# Patient Record
Sex: MALE | Race: WHITE | NOT HISPANIC OR LATINO | Employment: FULL TIME | URBAN - METROPOLITAN AREA
[De-identification: names, ages, dates, MRNs, and addresses within clinical notes are randomized per-mention and may not be internally consistent; named-entity substitution may affect disease eponyms.]

---

## 2017-08-09 ENCOUNTER — HOSPITAL ENCOUNTER (OUTPATIENT)
Dept: RADIOLOGY | Facility: MEDICAL CENTER | Age: 47
Discharge: HOME | End: 2017-08-09
Payer: COMMERCIAL

## 2017-08-09 ENCOUNTER — TRANSCRIBE ORDERS (OUTPATIENT)
Dept: ADMINISTRATIVE | Facility: HOSPITAL | Age: 47
End: 2017-08-09

## 2017-08-09 DIAGNOSIS — N50.819 ORCHIALGIA: Primary | ICD-10-CM

## 2017-08-09 DIAGNOSIS — N50.819 ORCHIALGIA: ICD-10-CM

## 2017-08-09 PROCEDURE — 76870 US EXAM SCROTUM: CPT

## 2017-08-15 ENCOUNTER — APPOINTMENT (EMERGENCY)
Dept: RADIOLOGY | Facility: HOSPITAL | Age: 47
End: 2017-08-15
Payer: COMMERCIAL

## 2017-08-15 ENCOUNTER — HOSPITAL ENCOUNTER (EMERGENCY)
Facility: HOSPITAL | Age: 47
Discharge: HOME | End: 2017-08-15
Attending: EMERGENCY MEDICINE
Payer: COMMERCIAL

## 2017-08-15 VITALS
OXYGEN SATURATION: 100 % | RESPIRATION RATE: 16 BRPM | WEIGHT: 230 LBS | SYSTOLIC BLOOD PRESSURE: 125 MMHG | TEMPERATURE: 98.1 F | HEART RATE: 58 BPM | DIASTOLIC BLOOD PRESSURE: 84 MMHG

## 2017-08-15 DIAGNOSIS — R07.9 CHEST PAIN: Primary | ICD-10-CM

## 2017-08-15 LAB
ALBUMIN SERPL BCP-MCNC: 3.8 G/DL (ref 3.5–5)
ALP SERPL-CCNC: 93 U/L (ref 46–116)
ALT SERPL W P-5'-P-CCNC: 36 U/L (ref 12–78)
ANION GAP SERPL CALCULATED.3IONS-SCNC: 8 MMOL/L (ref 4–13)
AST SERPL W P-5'-P-CCNC: 21 U/L (ref 5–45)
ATRIAL RATE: 63 BPM
BASOPHILS # BLD AUTO: 0.03 THOUSANDS/ΜL (ref 0–0.1)
BASOPHILS NFR BLD AUTO: 0 % (ref 0–1)
BILIRUB SERPL-MCNC: 0.6 MG/DL (ref 0.2–1)
BUN SERPL-MCNC: 10 MG/DL (ref 5–25)
CALCIUM SERPL-MCNC: 9.1 MG/DL (ref 8.3–10.1)
CHLORIDE SERPL-SCNC: 103 MMOL/L (ref 100–108)
CO2 SERPL-SCNC: 28 MMOL/L (ref 21–32)
CREAT SERPL-MCNC: 0.99 MG/DL (ref 0.6–1.3)
EOSINOPHIL # BLD AUTO: 0.12 THOUSAND/ΜL (ref 0–0.61)
EOSINOPHIL NFR BLD AUTO: 2 % (ref 0–6)
ERYTHROCYTE [DISTWIDTH] IN BLOOD BY AUTOMATED COUNT: 14.2 % (ref 11.6–15.1)
GFR SERPL CREATININE-BSD FRML MDRD: 92 ML/MIN/1.73SQ M
GLUCOSE SERPL-MCNC: 136 MG/DL (ref 65–140)
HCT VFR BLD AUTO: 46.6 % (ref 36.5–49.3)
HGB BLD-MCNC: 15.4 G/DL (ref 12–17)
LYMPHOCYTES # BLD AUTO: 1.77 THOUSANDS/ΜL (ref 0.6–4.47)
LYMPHOCYTES NFR BLD AUTO: 26 % (ref 14–44)
MCH RBC QN AUTO: 31.9 PG (ref 26.8–34.3)
MCHC RBC AUTO-ENTMCNC: 33 G/DL (ref 31.4–37.4)
MCV RBC AUTO: 97 FL (ref 82–98)
MONOCYTES # BLD AUTO: 0.56 THOUSAND/ΜL (ref 0.17–1.22)
MONOCYTES NFR BLD AUTO: 8 % (ref 4–12)
NEUTROPHILS # BLD AUTO: 4.42 THOUSANDS/ΜL (ref 1.85–7.62)
NEUTS SEG NFR BLD AUTO: 64 % (ref 43–75)
P AXIS: 65 DEGREES
PLATELET # BLD AUTO: 205 THOUSANDS/UL (ref 149–390)
PMV BLD AUTO: 10.7 FL (ref 8.9–12.7)
POTASSIUM SERPL-SCNC: 4.2 MMOL/L (ref 3.5–5.3)
PR INTERVAL: 156 MS
PROT SERPL-MCNC: 7.7 G/DL (ref 6.4–8.2)
QRS AXIS: 62 DEGREES
QRSD INTERVAL: 92 MS
QT INTERVAL: 400 MS
QTC INTERVAL: 409 MS
RBC # BLD AUTO: 4.83 MILLION/UL (ref 3.88–5.62)
SODIUM SERPL-SCNC: 139 MMOL/L (ref 136–145)
T WAVE AXIS: 54 DEGREES
TROPONIN I SERPL-MCNC: <0.02 NG/ML
VENTRICULAR RATE: 63 BPM
WBC # BLD AUTO: 6.9 THOUSAND/UL (ref 4.31–10.16)

## 2017-08-15 PROCEDURE — 93005 ELECTROCARDIOGRAM TRACING: CPT

## 2017-08-15 PROCEDURE — 84484 ASSAY OF TROPONIN QUANT: CPT | Performed by: EMERGENCY MEDICINE

## 2017-08-15 PROCEDURE — 99285 EMERGENCY DEPT VISIT HI MDM: CPT

## 2017-08-15 PROCEDURE — 93005 ELECTROCARDIOGRAM TRACING: CPT | Performed by: EMERGENCY MEDICINE

## 2017-08-15 PROCEDURE — 36415 COLL VENOUS BLD VENIPUNCTURE: CPT | Performed by: EMERGENCY MEDICINE

## 2017-08-15 PROCEDURE — 85025 COMPLETE CBC W/AUTO DIFF WBC: CPT | Performed by: EMERGENCY MEDICINE

## 2017-08-15 PROCEDURE — 71020 HB CHEST X-RAY 2VW FRONTAL&LATL: CPT

## 2017-08-15 PROCEDURE — 80053 COMPREHEN METABOLIC PANEL: CPT | Performed by: EMERGENCY MEDICINE

## 2017-12-15 ENCOUNTER — TRANSCRIBE ORDERS (OUTPATIENT)
Dept: ADMINISTRATIVE | Facility: HOSPITAL | Age: 47
End: 2017-12-15

## 2017-12-15 ENCOUNTER — APPOINTMENT (OUTPATIENT)
Dept: LAB | Facility: MEDICAL CENTER | Age: 47
End: 2017-12-15
Payer: COMMERCIAL

## 2017-12-15 DIAGNOSIS — L40.0 PSORIASIS VULGARIS: Primary | ICD-10-CM

## 2017-12-15 LAB
ALBUMIN SERPL BCP-MCNC: 3.8 G/DL (ref 3.5–5)
ALP SERPL-CCNC: 85 U/L (ref 46–116)
ALT SERPL W P-5'-P-CCNC: 30 U/L (ref 12–78)
ANION GAP SERPL CALCULATED.3IONS-SCNC: 4 MMOL/L (ref 4–13)
AST SERPL W P-5'-P-CCNC: 25 U/L (ref 5–45)
BASOPHILS # BLD AUTO: 0.03 THOUSANDS/ΜL (ref 0–0.1)
BASOPHILS NFR BLD AUTO: 0 % (ref 0–1)
BILIRUB DIRECT SERPL-MCNC: 0.29 MG/DL (ref 0–0.2)
BILIRUB SERPL-MCNC: 1.26 MG/DL (ref 0.2–1)
BUN SERPL-MCNC: 14 MG/DL (ref 5–25)
CALCIUM SERPL-MCNC: 9.3 MG/DL (ref 8.3–10.1)
CHLORIDE SERPL-SCNC: 105 MMOL/L (ref 100–108)
CO2 SERPL-SCNC: 28 MMOL/L (ref 21–32)
CREAT SERPL-MCNC: 1.01 MG/DL (ref 0.6–1.3)
EOSINOPHIL # BLD AUTO: 0.15 THOUSAND/ΜL (ref 0–0.61)
EOSINOPHIL NFR BLD AUTO: 2 % (ref 0–6)
ERYTHROCYTE [DISTWIDTH] IN BLOOD BY AUTOMATED COUNT: 13.8 % (ref 11.6–15.1)
GFR SERPL CREATININE-BSD FRML MDRD: 89 ML/MIN/1.73SQ M
GLUCOSE P FAST SERPL-MCNC: 139 MG/DL (ref 65–99)
HBV CORE IGM SER QL: NORMAL
HBV SURFACE AB SER-ACNC: <3.1 MIU/ML
HBV SURFACE AG SER QL: NORMAL
HCT VFR BLD AUTO: 46.1 % (ref 36.5–49.3)
HCV AB SER QL: NORMAL
HGB BLD-MCNC: 15.5 G/DL (ref 12–17)
LYMPHOCYTES # BLD AUTO: 2.05 THOUSANDS/ΜL (ref 0.6–4.47)
LYMPHOCYTES NFR BLD AUTO: 26 % (ref 14–44)
MCH RBC QN AUTO: 32.6 PG (ref 26.8–34.3)
MCHC RBC AUTO-ENTMCNC: 33.6 G/DL (ref 31.4–37.4)
MCV RBC AUTO: 97 FL (ref 82–98)
MONOCYTES # BLD AUTO: 0.6 THOUSAND/ΜL (ref 0.17–1.22)
MONOCYTES NFR BLD AUTO: 8 % (ref 4–12)
NEUTROPHILS # BLD AUTO: 5.03 THOUSANDS/ΜL (ref 1.85–7.62)
NEUTS SEG NFR BLD AUTO: 64 % (ref 43–75)
NRBC BLD AUTO-RTO: 0 /100 WBCS
PLATELET # BLD AUTO: 220 THOUSANDS/UL (ref 149–390)
PMV BLD AUTO: 11.2 FL (ref 8.9–12.7)
POTASSIUM SERPL-SCNC: 4.2 MMOL/L (ref 3.5–5.3)
PROT SERPL-MCNC: 7.9 G/DL (ref 6.4–8.2)
RBC # BLD AUTO: 4.75 MILLION/UL (ref 3.88–5.62)
SODIUM SERPL-SCNC: 137 MMOL/L (ref 136–145)
WBC # BLD AUTO: 7.88 THOUSAND/UL (ref 4.31–10.16)

## 2017-12-15 PROCEDURE — 36415 COLL VENOUS BLD VENIPUNCTURE: CPT | Performed by: NURSE PRACTITIONER

## 2017-12-15 PROCEDURE — 85025 COMPLETE CBC W/AUTO DIFF WBC: CPT | Performed by: NURSE PRACTITIONER

## 2017-12-15 PROCEDURE — 86705 HEP B CORE ANTIBODY IGM: CPT | Performed by: NURSE PRACTITIONER

## 2017-12-15 PROCEDURE — 86706 HEP B SURFACE ANTIBODY: CPT | Performed by: NURSE PRACTITIONER

## 2017-12-15 PROCEDURE — 86480 TB TEST CELL IMMUN MEASURE: CPT | Performed by: NURSE PRACTITIONER

## 2017-12-15 PROCEDURE — 82248 BILIRUBIN DIRECT: CPT | Performed by: NURSE PRACTITIONER

## 2017-12-15 PROCEDURE — 80053 COMPREHEN METABOLIC PANEL: CPT | Performed by: NURSE PRACTITIONER

## 2017-12-15 PROCEDURE — 86803 HEPATITIS C AB TEST: CPT | Performed by: NURSE PRACTITIONER

## 2017-12-15 PROCEDURE — 87340 HEPATITIS B SURFACE AG IA: CPT | Performed by: NURSE PRACTITIONER

## 2017-12-17 LAB
ANNOTATION COMMENT IMP: NORMAL
GAMMA INTERFERON BACKGROUND BLD IA-ACNC: 0.02 IU/ML
M TB IFN-G BLD-IMP: NEGATIVE
M TB IFN-G CD4+ BCKGRND COR BLD-ACNC: 0 IU/ML
M TB IFN-G CD4+ T-CELLS BLD-ACNC: 0.02 IU/ML
MITOGEN IGNF BLD-ACNC: 7.75 IU/ML
QUANTIFERON-TB GOLD IN TUBE: NORMAL
SERVICE CMNT-IMP: NORMAL

## 2020-08-15 ENCOUNTER — APPOINTMENT (OUTPATIENT)
Dept: LAB | Facility: MEDICAL CENTER | Age: 50
End: 2020-08-15
Payer: COMMERCIAL

## 2020-08-15 ENCOUNTER — TRANSCRIBE ORDERS (OUTPATIENT)
Dept: ADMINISTRATIVE | Facility: HOSPITAL | Age: 50
End: 2020-08-15

## 2020-08-15 DIAGNOSIS — E78.2 MIXED HYPERLIPIDEMIA: ICD-10-CM

## 2020-08-15 DIAGNOSIS — E11.9 TYPE 2 DIABETES MELLITUS WITHOUT COMPLICATION, UNSPECIFIED WHETHER LONG TERM INSULIN USE (HCC): ICD-10-CM

## 2020-08-15 DIAGNOSIS — I10 ESSENTIAL HYPERTENSION, BENIGN: ICD-10-CM

## 2020-08-15 DIAGNOSIS — I10 ESSENTIAL HYPERTENSION, BENIGN: Primary | ICD-10-CM

## 2020-08-15 LAB
ALBUMIN SERPL BCP-MCNC: 3.9 G/DL (ref 3.5–5)
ALP SERPL-CCNC: 74 U/L (ref 46–116)
ALT SERPL W P-5'-P-CCNC: 42 U/L (ref 12–78)
ANION GAP SERPL CALCULATED.3IONS-SCNC: 5 MMOL/L (ref 4–13)
AST SERPL W P-5'-P-CCNC: 26 U/L (ref 5–45)
BACTERIA UR QL AUTO: NORMAL /HPF
BASOPHILS # BLD AUTO: 0.04 THOUSANDS/ΜL (ref 0–0.1)
BASOPHILS NFR BLD AUTO: 1 % (ref 0–1)
BILIRUB SERPL-MCNC: 0.97 MG/DL (ref 0.2–1)
BILIRUB UR QL STRIP: NEGATIVE
BUN SERPL-MCNC: 12 MG/DL (ref 5–25)
CALCIUM SERPL-MCNC: 8.8 MG/DL (ref 8.3–10.1)
CHLORIDE SERPL-SCNC: 107 MMOL/L (ref 100–108)
CHOLEST SERPL-MCNC: 188 MG/DL (ref 50–200)
CLARITY UR: CLEAR
CO2 SERPL-SCNC: 29 MMOL/L (ref 21–32)
COLOR UR: YELLOW
CREAT SERPL-MCNC: 1.03 MG/DL (ref 0.6–1.3)
CREAT UR-MCNC: 132 MG/DL
EOSINOPHIL # BLD AUTO: 0.2 THOUSAND/ΜL (ref 0–0.61)
EOSINOPHIL NFR BLD AUTO: 3 % (ref 0–6)
ERYTHROCYTE [DISTWIDTH] IN BLOOD BY AUTOMATED COUNT: 13.1 % (ref 11.6–15.1)
ERYTHROCYTE [SEDIMENTATION RATE] IN BLOOD: 8 MM/HOUR (ref 0–14)
EST. AVERAGE GLUCOSE BLD GHB EST-MCNC: 140 MG/DL
GFR SERPL CREATININE-BSD FRML MDRD: 85 ML/MIN/1.73SQ M
GLUCOSE P FAST SERPL-MCNC: 132 MG/DL (ref 65–99)
GLUCOSE UR STRIP-MCNC: ABNORMAL MG/DL
HBA1C MFR BLD: 6.5 %
HCT VFR BLD AUTO: 46.5 % (ref 36.5–49.3)
HDLC SERPL-MCNC: 62 MG/DL
HGB BLD-MCNC: 15.5 G/DL (ref 12–17)
HGB UR QL STRIP.AUTO: NEGATIVE
HYALINE CASTS #/AREA URNS LPF: NORMAL /LPF
IMM GRANULOCYTES # BLD AUTO: 0.02 THOUSAND/UL (ref 0–0.2)
IMM GRANULOCYTES NFR BLD AUTO: 0 % (ref 0–2)
KETONES UR STRIP-MCNC: NEGATIVE MG/DL
LDLC SERPL CALC-MCNC: 107 MG/DL (ref 0–100)
LEUKOCYTE ESTERASE UR QL STRIP: ABNORMAL
LYMPHOCYTES # BLD AUTO: 2.57 THOUSANDS/ΜL (ref 0.6–4.47)
LYMPHOCYTES NFR BLD AUTO: 32 % (ref 14–44)
MCH RBC QN AUTO: 33.8 PG (ref 26.8–34.3)
MCHC RBC AUTO-ENTMCNC: 33.3 G/DL (ref 31.4–37.4)
MCV RBC AUTO: 102 FL (ref 82–98)
MICROALBUMIN UR-MCNC: 17.1 MG/L (ref 0–20)
MICROALBUMIN/CREAT 24H UR: 13 MG/G CREATININE (ref 0–30)
MONOCYTES # BLD AUTO: 0.6 THOUSAND/ΜL (ref 0.17–1.22)
MONOCYTES NFR BLD AUTO: 8 % (ref 4–12)
NEUTROPHILS # BLD AUTO: 4.59 THOUSANDS/ΜL (ref 1.85–7.62)
NEUTS SEG NFR BLD AUTO: 56 % (ref 43–75)
NITRITE UR QL STRIP: NEGATIVE
NON-SQ EPI CELLS URNS QL MICRO: NORMAL /HPF
NONHDLC SERPL-MCNC: 126 MG/DL
NRBC BLD AUTO-RTO: 0 /100 WBCS
PH UR STRIP.AUTO: 5.5 [PH]
PLATELET # BLD AUTO: 202 THOUSANDS/UL (ref 149–390)
PMV BLD AUTO: 10.9 FL (ref 8.9–12.7)
POTASSIUM SERPL-SCNC: 4.6 MMOL/L (ref 3.5–5.3)
PROT SERPL-MCNC: 7.4 G/DL (ref 6.4–8.2)
PROT UR STRIP-MCNC: NEGATIVE MG/DL
PSA SERPL-MCNC: 0.7 NG/ML (ref 0–4)
RBC # BLD AUTO: 4.58 MILLION/UL (ref 3.88–5.62)
RBC #/AREA URNS AUTO: NORMAL /HPF
SODIUM SERPL-SCNC: 141 MMOL/L (ref 136–145)
SP GR UR STRIP.AUTO: 1.02 (ref 1–1.03)
TRIGL SERPL-MCNC: 94 MG/DL
TSH SERPL DL<=0.05 MIU/L-ACNC: 0.64 UIU/ML (ref 0.36–3.74)
UROBILINOGEN UR QL STRIP.AUTO: 0.2 E.U./DL
WBC # BLD AUTO: 8.02 THOUSAND/UL (ref 4.31–10.16)
WBC #/AREA URNS AUTO: NORMAL /HPF

## 2020-08-15 PROCEDURE — 80061 LIPID PANEL: CPT

## 2020-08-15 PROCEDURE — 82570 ASSAY OF URINE CREATININE: CPT | Performed by: INTERNAL MEDICINE

## 2020-08-15 PROCEDURE — 82043 UR ALBUMIN QUANTITATIVE: CPT | Performed by: INTERNAL MEDICINE

## 2020-08-15 PROCEDURE — 81001 URINALYSIS AUTO W/SCOPE: CPT | Performed by: INTERNAL MEDICINE

## 2020-08-15 PROCEDURE — 84153 ASSAY OF PSA TOTAL: CPT

## 2020-08-15 PROCEDURE — 36415 COLL VENOUS BLD VENIPUNCTURE: CPT

## 2020-08-15 PROCEDURE — 85652 RBC SED RATE AUTOMATED: CPT

## 2020-08-15 PROCEDURE — 85025 COMPLETE CBC W/AUTO DIFF WBC: CPT

## 2020-08-15 PROCEDURE — 84443 ASSAY THYROID STIM HORMONE: CPT

## 2020-08-15 PROCEDURE — 80053 COMPREHEN METABOLIC PANEL: CPT

## 2020-08-15 PROCEDURE — 83036 HEMOGLOBIN GLYCOSYLATED A1C: CPT

## 2021-03-16 ENCOUNTER — TRANSCRIBE ORDERS (OUTPATIENT)
Dept: ADMINISTRATIVE | Facility: HOSPITAL | Age: 51
End: 2021-03-16

## 2021-03-16 DIAGNOSIS — M65.832 OTHER SYNOVITIS AND TENOSYNOVITIS, LEFT FOREARM: Primary | ICD-10-CM

## 2021-03-26 ENCOUNTER — HOSPITAL ENCOUNTER (OUTPATIENT)
Dept: RADIOLOGY | Age: 51
Discharge: HOME | End: 2021-03-26
Payer: COMMERCIAL

## 2021-03-26 DIAGNOSIS — M65.832 OTHER SYNOVITIS AND TENOSYNOVITIS, LEFT FOREARM: ICD-10-CM

## 2021-03-26 PROCEDURE — 73221 MRI JOINT UPR EXTREM W/O DYE: CPT

## 2021-06-11 ENCOUNTER — TRANSCRIBE ORDERS (OUTPATIENT)
Dept: ADMINISTRATIVE | Facility: HOSPITAL | Age: 51
End: 2021-06-11

## 2021-06-11 ENCOUNTER — APPOINTMENT (OUTPATIENT)
Dept: LAB | Facility: MEDICAL CENTER | Age: 51
End: 2021-06-11
Payer: COMMERCIAL

## 2021-06-11 DIAGNOSIS — L40.0 PSORIASIS VULGARIS: Primary | ICD-10-CM

## 2021-06-11 DIAGNOSIS — E11.9 TYPE 2 DIABETES MELLITUS WITHOUT COMPLICATION, WITHOUT LONG-TERM CURRENT USE OF INSULIN (HCC): Primary | ICD-10-CM

## 2021-06-11 DIAGNOSIS — L40.0 PSORIASIS VULGARIS: ICD-10-CM

## 2021-06-11 LAB
ANION GAP SERPL CALCULATED.3IONS-SCNC: 3 MMOL/L (ref 4–13)
BASOPHILS # BLD AUTO: 0.04 THOUSANDS/ΜL (ref 0–0.1)
BASOPHILS NFR BLD AUTO: 1 % (ref 0–1)
BUN SERPL-MCNC: 13 MG/DL (ref 5–25)
CALCIUM SERPL-MCNC: 9.4 MG/DL (ref 8.3–10.1)
CHLORIDE SERPL-SCNC: 108 MMOL/L (ref 100–108)
CO2 SERPL-SCNC: 30 MMOL/L (ref 21–32)
CREAT SERPL-MCNC: 1.01 MG/DL (ref 0.6–1.3)
EOSINOPHIL # BLD AUTO: 0.13 THOUSAND/ΜL (ref 0–0.61)
EOSINOPHIL NFR BLD AUTO: 2 % (ref 0–6)
ERYTHROCYTE [DISTWIDTH] IN BLOOD BY AUTOMATED COUNT: 13.4 % (ref 11.6–15.1)
EST. AVERAGE GLUCOSE BLD GHB EST-MCNC: 134 MG/DL
GFR SERPL CREATININE-BSD FRML MDRD: 87 ML/MIN/1.73SQ M
GLUCOSE P FAST SERPL-MCNC: 143 MG/DL (ref 65–99)
HBA1C MFR BLD: 6.3 %
HCT VFR BLD AUTO: 46.8 % (ref 36.5–49.3)
HGB BLD-MCNC: 15.1 G/DL (ref 12–17)
IMM GRANULOCYTES # BLD AUTO: 0.02 THOUSAND/UL (ref 0–0.2)
IMM GRANULOCYTES NFR BLD AUTO: 0 % (ref 0–2)
LYMPHOCYTES # BLD AUTO: 2.18 THOUSANDS/ΜL (ref 0.6–4.47)
LYMPHOCYTES NFR BLD AUTO: 37 % (ref 14–44)
MCH RBC QN AUTO: 32.5 PG (ref 26.8–34.3)
MCHC RBC AUTO-ENTMCNC: 32.3 G/DL (ref 31.4–37.4)
MCV RBC AUTO: 101 FL (ref 82–98)
MONOCYTES # BLD AUTO: 0.55 THOUSAND/ΜL (ref 0.17–1.22)
MONOCYTES NFR BLD AUTO: 9 % (ref 4–12)
NEUTROPHILS # BLD AUTO: 2.98 THOUSANDS/ΜL (ref 1.85–7.62)
NEUTS SEG NFR BLD AUTO: 51 % (ref 43–75)
NRBC BLD AUTO-RTO: 0 /100 WBCS
PLATELET # BLD AUTO: 195 THOUSANDS/UL (ref 149–390)
PMV BLD AUTO: 10.7 FL (ref 8.9–12.7)
POTASSIUM SERPL-SCNC: 4.3 MMOL/L (ref 3.5–5.3)
RBC # BLD AUTO: 4.64 MILLION/UL (ref 3.88–5.62)
SODIUM SERPL-SCNC: 141 MMOL/L (ref 136–145)
WBC # BLD AUTO: 5.9 THOUSAND/UL (ref 4.31–10.16)

## 2021-06-11 PROCEDURE — 80048 BASIC METABOLIC PNL TOTAL CA: CPT | Performed by: NURSE PRACTITIONER

## 2021-06-11 PROCEDURE — 36415 COLL VENOUS BLD VENIPUNCTURE: CPT | Performed by: INTERNAL MEDICINE

## 2021-06-11 PROCEDURE — 83036 HEMOGLOBIN GLYCOSYLATED A1C: CPT | Performed by: INTERNAL MEDICINE

## 2021-06-11 PROCEDURE — 86480 TB TEST CELL IMMUN MEASURE: CPT

## 2021-06-11 PROCEDURE — 85025 COMPLETE CBC W/AUTO DIFF WBC: CPT | Performed by: NURSE PRACTITIONER

## 2021-06-16 LAB
GAMMA INTERFERON BACKGROUND BLD IA-ACNC: 0.02 IU/ML
M TB IFN-G BLD-IMP: NEGATIVE
M TB IFN-G CD4+ BCKGRND COR BLD-ACNC: 0.01 IU/ML
M TB IFN-G CD4+ BCKGRND COR BLD-ACNC: 0.02 IU/ML
MITOGEN IGNF BCKGRD COR BLD-ACNC: >10 IU/ML

## 2022-03-05 ENCOUNTER — APPOINTMENT (OUTPATIENT)
Dept: LAB | Facility: MEDICAL CENTER | Age: 52
End: 2022-03-05
Payer: COMMERCIAL

## 2022-03-05 DIAGNOSIS — I10 ESSENTIAL HYPERTENSION, BENIGN: ICD-10-CM

## 2022-03-05 DIAGNOSIS — N40.0 BENIGN PROSTATIC HYPERPLASIA, UNSPECIFIED WHETHER LOWER URINARY TRACT SYMPTOMS PRESENT: ICD-10-CM

## 2022-03-05 DIAGNOSIS — E11.9 TYPE 2 DIABETES MELLITUS WITHOUT COMPLICATION, WITHOUT LONG-TERM CURRENT USE OF INSULIN (HCC): ICD-10-CM

## 2022-03-05 DIAGNOSIS — E78.2 MIXED HYPERLIPIDEMIA: ICD-10-CM

## 2022-03-05 LAB
ALBUMIN SERPL BCP-MCNC: 3.7 G/DL (ref 3.5–5)
ALP SERPL-CCNC: 92 U/L (ref 46–116)
ALT SERPL W P-5'-P-CCNC: 31 U/L (ref 12–78)
ANION GAP SERPL CALCULATED.3IONS-SCNC: 1 MMOL/L (ref 4–13)
AST SERPL W P-5'-P-CCNC: 19 U/L (ref 5–45)
BASOPHILS # BLD AUTO: 0.03 THOUSANDS/ΜL (ref 0–0.1)
BASOPHILS NFR BLD AUTO: 0 % (ref 0–1)
BILIRUB SERPL-MCNC: 0.61 MG/DL (ref 0.2–1)
BUN SERPL-MCNC: 13 MG/DL (ref 5–25)
CALCIUM SERPL-MCNC: 9.8 MG/DL (ref 8.3–10.1)
CHLORIDE SERPL-SCNC: 109 MMOL/L (ref 100–108)
CHOLEST SERPL-MCNC: 176 MG/DL
CO2 SERPL-SCNC: 29 MMOL/L (ref 21–32)
CREAT SERPL-MCNC: 1 MG/DL (ref 0.6–1.3)
EOSINOPHIL # BLD AUTO: 0.13 THOUSAND/ΜL (ref 0–0.61)
EOSINOPHIL NFR BLD AUTO: 2 % (ref 0–6)
ERYTHROCYTE [DISTWIDTH] IN BLOOD BY AUTOMATED COUNT: 12.8 % (ref 11.6–15.1)
ERYTHROCYTE [SEDIMENTATION RATE] IN BLOOD: 15 MM/HOUR (ref 0–19)
EST. AVERAGE GLUCOSE BLD GHB EST-MCNC: 160 MG/DL
GFR SERPL CREATININE-BSD FRML MDRD: 87 ML/MIN/1.73SQ M
GLUCOSE P FAST SERPL-MCNC: 126 MG/DL (ref 65–99)
HBA1C MFR BLD: 7.2 %
HCT VFR BLD AUTO: 47.9 % (ref 36.5–49.3)
HDLC SERPL-MCNC: 60 MG/DL
HGB BLD-MCNC: 16 G/DL (ref 12–17)
IMM GRANULOCYTES # BLD AUTO: 0.03 THOUSAND/UL (ref 0–0.2)
IMM GRANULOCYTES NFR BLD AUTO: 0 % (ref 0–2)
LDLC SERPL CALC-MCNC: 97 MG/DL (ref 0–100)
LYMPHOCYTES # BLD AUTO: 2.21 THOUSANDS/ΜL (ref 0.6–4.47)
LYMPHOCYTES NFR BLD AUTO: 27 % (ref 14–44)
MCH RBC QN AUTO: 32.9 PG (ref 26.8–34.3)
MCHC RBC AUTO-ENTMCNC: 33.4 G/DL (ref 31.4–37.4)
MCV RBC AUTO: 98 FL (ref 82–98)
MONOCYTES # BLD AUTO: 0.89 THOUSAND/ΜL (ref 0.17–1.22)
MONOCYTES NFR BLD AUTO: 11 % (ref 4–12)
NEUTROPHILS # BLD AUTO: 4.84 THOUSANDS/ΜL (ref 1.85–7.62)
NEUTS SEG NFR BLD AUTO: 60 % (ref 43–75)
NONHDLC SERPL-MCNC: 116 MG/DL
NRBC BLD AUTO-RTO: 0 /100 WBCS
PLATELET # BLD AUTO: 240 THOUSANDS/UL (ref 149–390)
PMV BLD AUTO: 10.9 FL (ref 8.9–12.7)
POTASSIUM SERPL-SCNC: 4.6 MMOL/L (ref 3.5–5.3)
PROT SERPL-MCNC: 8 G/DL (ref 6.4–8.2)
PSA SERPL-MCNC: 0.8 NG/ML (ref 0–4)
RBC # BLD AUTO: 4.87 MILLION/UL (ref 3.88–5.62)
SODIUM SERPL-SCNC: 139 MMOL/L (ref 136–145)
TRIGL SERPL-MCNC: 96 MG/DL
TSH SERPL DL<=0.05 MIU/L-ACNC: 1.02 UIU/ML (ref 0.36–3.74)
WBC # BLD AUTO: 8.13 THOUSAND/UL (ref 4.31–10.16)

## 2022-03-05 PROCEDURE — 83036 HEMOGLOBIN GLYCOSYLATED A1C: CPT

## 2022-03-05 PROCEDURE — 84153 ASSAY OF PSA TOTAL: CPT

## 2022-03-05 PROCEDURE — 85025 COMPLETE CBC W/AUTO DIFF WBC: CPT

## 2022-03-05 PROCEDURE — 85652 RBC SED RATE AUTOMATED: CPT

## 2022-03-05 PROCEDURE — 80061 LIPID PANEL: CPT

## 2022-03-05 PROCEDURE — 84443 ASSAY THYROID STIM HORMONE: CPT

## 2022-03-05 PROCEDURE — 36415 COLL VENOUS BLD VENIPUNCTURE: CPT

## 2022-03-05 PROCEDURE — 80053 COMPREHEN METABOLIC PANEL: CPT

## 2022-06-12 ENCOUNTER — OFFICE VISIT (OUTPATIENT)
Dept: URGENT CARE | Facility: MEDICAL CENTER | Age: 52
End: 2022-06-12
Payer: COMMERCIAL

## 2022-06-12 ENCOUNTER — APPOINTMENT (OUTPATIENT)
Dept: RADIOLOGY | Facility: MEDICAL CENTER | Age: 52
End: 2022-06-12
Payer: COMMERCIAL

## 2022-06-12 VITALS
HEART RATE: 71 BPM | OXYGEN SATURATION: 97 % | TEMPERATURE: 98 F | DIASTOLIC BLOOD PRESSURE: 76 MMHG | SYSTOLIC BLOOD PRESSURE: 120 MMHG | RESPIRATION RATE: 16 BRPM

## 2022-06-12 DIAGNOSIS — S60.10XA SUBUNGUAL HEMATOMA OF DIGIT OF HAND, INITIAL ENCOUNTER: ICD-10-CM

## 2022-06-12 DIAGNOSIS — S67.191A CRUSHING INJURY OF LEFT INDEX FINGER, INITIAL ENCOUNTER: ICD-10-CM

## 2022-06-12 DIAGNOSIS — S67.191A CRUSHING INJURY OF LEFT INDEX FINGER, INITIAL ENCOUNTER: Primary | ICD-10-CM

## 2022-06-12 PROCEDURE — 73140 X-RAY EXAM OF FINGER(S): CPT

## 2022-06-12 PROCEDURE — 11740 EVACUATION SUBUNGUAL HMTMA: CPT | Performed by: PHYSICIAN ASSISTANT

## 2022-06-12 PROCEDURE — 99213 OFFICE O/P EST LOW 20 MIN: CPT | Performed by: PHYSICIAN ASSISTANT

## 2022-06-12 RX ORDER — EMPAGLIFLOZIN, METFORMIN HYDROCHLORIDE 5; 1000 MG/1; MG/1
TABLET, EXTENDED RELEASE ORAL
COMMUNITY

## 2022-06-12 NOTE — PROGRESS NOTES
[address] [facility]        NAME: Sanjana Fisher is a 49 y o  male  : 1970    MRN: [id number]  DATE: 2022  TIME: 11:15 AM    Assessment and Plan   Crushing injury of left index finger, initial encounter [S67 191A]  1  Crushing injury of left index finger, initial encounter  XR finger left second digit-index   2  Subungual hematoma of digit of hand, initial encounter  XR finger left second digit-index         Patient Instructions   1  Over-the-counter ibuprofen and/or acetaminophen as needed for pain  2  Perform ice compresses and elevation to the left 2nd finger 3-4 times daily for 15-20 minutes for the next 3-4 days  3  Go to the ER for significantly worsening symptoms  4  Follow-up with orthopedics in 7-10 days for any persistent non improving symptoms  Chief Complaint     Chief Complaint   Patient presents with    Hand Injury     Hit left index finger with hammer last night doing electrical work  Swelling and discoloration  Loss of range of motion  10/10 pain at all times   Rash     Poison ivy or sumac exposure last Saturday  Skin dry and itchy  History of Present Illness       60-year-old male patient who was using a sledgehammer last night and accidentally crushed his left 2nd finger  Pain is been moderate ever since  He also notes a discoloration under the fingernail  Marked increase in pain with any attempt moving the finger  Denies any distal paresthesias or decrease in sensation subjectively  Review of Systems   Review of Systems   Constitutional: Negative for chills and fever  HENT: Negative for ear pain and sore throat  Eyes: Negative for pain and visual disturbance  Respiratory: Negative for cough and shortness of breath  Cardiovascular: Negative for chest pain and palpitations  Gastrointestinal: Negative for abdominal pain and vomiting  Genitourinary: Negative for dysuria and hematuria  Musculoskeletal: Positive for arthralgias  Negative for back pain  Skin: Positive for color change  Negative for rash  Neurological: Negative for seizures and syncope  All other systems reviewed and are negative  Current Medications       Current Outpatient Medications:     adalimumab (HUMIRA) 40 mg/0 8 mL PSKT, Inject 1 Dose under the skin every 14 (fourteen) days, Disp: , Rfl:     Empagliflozin-metFORMIN HCl ER (Synjardy XR) 5-1000 MG TB24, Synjardy XR 5 mg-1,000 mg tablet, extended release, Disp: , Rfl:     Apremilast 30 MG TABS, Take 30 mg by mouth 2 (two) times a day (Patient not taking: Reported on 6/12/2022), Disp: , Rfl:     betamethasone valerate (VALISONE) 0 1 % cream, Apply topically 2 (two) times a day (Patient not taking: Reported on 6/12/2022), Disp: 15 g, Rfl: 2    Current Allergies     Allergies as of 06/12/2022 - Reviewed 06/12/2022   Allergen Reaction Noted    Codeine  08/15/2017            The following portions of the patient's history were reviewed and updated as appropriate: allergies, current medications, past family history, past medical history, past social history, past surgical history and problem list      Past Medical History:   Diagnosis Date    Ear problems     HL (hearing loss)     Psoriasis        Past Surgical History:   Procedure Laterality Date    CHOLECYSTECTOMY      GASTRIC BYPASS         History reviewed  No pertinent family history  Medications have been verified  Objective   /76   Pulse 71   Temp 98 °F (36 7 °C)   Resp 16   SpO2 97%        Physical Exam     Physical Exam  Vitals and nursing note reviewed  Constitutional:       Appearance: Normal appearance  HENT:      Head: Normocephalic  Nose: Nose normal       Mouth/Throat:      Mouth: Mucous membranes are dry  Pharynx: Oropharynx is clear  Eyes:      Conjunctiva/sclera: Conjunctivae normal       Pupils: Pupils are equal, round, and reactive to light     Cardiovascular:      Rate and Rhythm: Normal rate and regular rhythm  Pulses: Normal pulses  Pulmonary:      Effort: Pulmonary effort is normal       Breath sounds: Normal breath sounds  Musculoskeletal:      Cervical back: Normal range of motion and neck supple  Comments: Left 2nd finger decreased range of motion at the D IP; however, full extension is intact at the D IP (no sign of mallet deformity)  Subtle swelling of the D IP as well  No deformities  Skin:     General: Skin is warm and dry  Capillary Refill: Capillary refill takes less than 2 seconds  Comments: 1/4 proximal subungual hematoma of the left 2nd finger  Finger nail however is intact  Neurological:      General: No focal deficit present  Mental Status: He is alert and oriented to person, place, and time  Psychiatric:         Mood and Affect: Mood normal          Behavior: Behavior normal        Orthopedic injury treatment    Date/Time: 6/12/2022 11:01 AM  Performed by: Salazar Carrasco PA-C  Authorized by: Salazar Carrasco PA-C   Universal Protocol:  Consent: Verbal consent obtained  Risks and benefits: risks, benefits and alternatives were discussed  Consent given by: patient  Patient understanding: patient states understanding of the procedure being performed  Patient identity confirmed: verbally with patient      Injury location:  Finger  Location details:  Left index finger  Injury type:   Soft tissue  Neurovascular status: Neurovascularly intact    Distal perfusion: normal    Neurological function: normal    Range of motion: reduced    Local anesthesia used?: No    General anesthesia used?: No    Neurovascular status: Neurovascularly intact    Distal perfusion: normal    Neurological function: normal    Range of motion: unchanged    Patient tolerance:  Patient tolerated the procedure well with no immediate complications   Nail trephination performed with battery operated disposable cautery tool by burning a pinpoint hole in the proximal aspect of the mid left 2nd finger nail  Pressurized blood collection relieved  No complications  Preliminary reading of left 2nd finger x-ray:   No acute fractures or dislocations seen

## 2022-06-12 NOTE — PATIENT INSTRUCTIONS
1  Over-the-counter ibuprofen and/or acetaminophen as needed for pain  2  Perform ice compresses and elevation to the left 2nd finger 3-4 times daily for 15-20 minutes for the next 3-4 days  3  Go to the ER for significantly worsening symptoms  4  Follow-up with orthopedics in 7-10 days for any persistent non improving symptoms

## 2022-07-23 ENCOUNTER — APPOINTMENT (OUTPATIENT)
Dept: LAB | Facility: MEDICAL CENTER | Age: 52
End: 2022-07-23
Payer: COMMERCIAL

## 2022-07-23 DIAGNOSIS — L40.0 PSORIASIS VULGARIS: ICD-10-CM

## 2022-07-23 LAB
ANION GAP SERPL CALCULATED.3IONS-SCNC: 3 MMOL/L (ref 4–13)
BASOPHILS # BLD AUTO: 0.03 THOUSANDS/ΜL (ref 0–0.1)
BASOPHILS NFR BLD AUTO: 0 % (ref 0–1)
BUN SERPL-MCNC: 12 MG/DL (ref 5–25)
CALCIUM SERPL-MCNC: 9.1 MG/DL (ref 8.3–10.1)
CHLORIDE SERPL-SCNC: 110 MMOL/L (ref 96–108)
CO2 SERPL-SCNC: 26 MMOL/L (ref 21–32)
CREAT SERPL-MCNC: 1.01 MG/DL (ref 0.6–1.3)
EOSINOPHIL # BLD AUTO: 0.14 THOUSAND/ΜL (ref 0–0.61)
EOSINOPHIL NFR BLD AUTO: 2 % (ref 0–6)
ERYTHROCYTE [DISTWIDTH] IN BLOOD BY AUTOMATED COUNT: 13.5 % (ref 11.6–15.1)
GFR SERPL CREATININE-BSD FRML MDRD: 86 ML/MIN/1.73SQ M
GLUCOSE SERPL-MCNC: 127 MG/DL (ref 65–140)
HCT VFR BLD AUTO: 47.5 % (ref 36.5–49.3)
HGB BLD-MCNC: 15.6 G/DL (ref 12–17)
IMM GRANULOCYTES # BLD AUTO: 0.01 THOUSAND/UL (ref 0–0.2)
IMM GRANULOCYTES NFR BLD AUTO: 0 % (ref 0–2)
LYMPHOCYTES # BLD AUTO: 2.05 THOUSANDS/ΜL (ref 0.6–4.47)
LYMPHOCYTES NFR BLD AUTO: 30 % (ref 14–44)
MCH RBC QN AUTO: 33.5 PG (ref 26.8–34.3)
MCHC RBC AUTO-ENTMCNC: 32.8 G/DL (ref 31.4–37.4)
MCV RBC AUTO: 102 FL (ref 82–98)
MONOCYTES # BLD AUTO: 0.56 THOUSAND/ΜL (ref 0.17–1.22)
MONOCYTES NFR BLD AUTO: 8 % (ref 4–12)
NEUTROPHILS # BLD AUTO: 4 THOUSANDS/ΜL (ref 1.85–7.62)
NEUTS SEG NFR BLD AUTO: 60 % (ref 43–75)
NRBC BLD AUTO-RTO: 0 /100 WBCS
PLATELET # BLD AUTO: 207 THOUSANDS/UL (ref 149–390)
PMV BLD AUTO: 10.3 FL (ref 8.9–12.7)
POTASSIUM SERPL-SCNC: 4.3 MMOL/L (ref 3.5–5.3)
RBC # BLD AUTO: 4.65 MILLION/UL (ref 3.88–5.62)
SODIUM SERPL-SCNC: 139 MMOL/L (ref 135–147)
WBC # BLD AUTO: 6.79 THOUSAND/UL (ref 4.31–10.16)

## 2022-07-23 PROCEDURE — 86480 TB TEST CELL IMMUN MEASURE: CPT

## 2022-07-23 PROCEDURE — 36415 COLL VENOUS BLD VENIPUNCTURE: CPT

## 2022-07-23 PROCEDURE — 80048 BASIC METABOLIC PNL TOTAL CA: CPT

## 2022-07-23 PROCEDURE — 85025 COMPLETE CBC W/AUTO DIFF WBC: CPT

## 2022-07-26 LAB
GAMMA INTERFERON BACKGROUND BLD IA-ACNC: 0.03 IU/ML
M TB IFN-G BLD-IMP: NEGATIVE
M TB IFN-G CD4+ BCKGRND COR BLD-ACNC: -0.01 IU/ML
M TB IFN-G CD4+ BCKGRND COR BLD-ACNC: -0.01 IU/ML
MITOGEN IGNF BCKGRD COR BLD-ACNC: >10 IU/ML

## 2022-08-06 ENCOUNTER — HOSPITAL ENCOUNTER (EMERGENCY)
Facility: HOSPITAL | Age: 52
Discharge: HOME | End: 2022-08-06
Attending: EMERGENCY MEDICINE
Payer: COMMERCIAL

## 2022-08-06 VITALS
SYSTOLIC BLOOD PRESSURE: 137 MMHG | OXYGEN SATURATION: 98 % | TEMPERATURE: 98 F | BODY MASS INDEX: 27.4 KG/M2 | RESPIRATION RATE: 18 BRPM | HEART RATE: 78 BPM | WEIGHT: 225 LBS | DIASTOLIC BLOOD PRESSURE: 89 MMHG | HEIGHT: 76 IN

## 2022-08-06 DIAGNOSIS — S05.02XA ABRASION OF LEFT CORNEA, INITIAL ENCOUNTER: Primary | ICD-10-CM

## 2022-08-06 PROCEDURE — 99283 EMERGENCY DEPT VISIT LOW MDM: CPT

## 2022-08-06 PROCEDURE — 99284 EMERGENCY DEPT VISIT MOD MDM: CPT | Performed by: SURGERY

## 2022-08-06 RX ORDER — ERYTHROMYCIN 5 MG/G
0.5 OINTMENT OPHTHALMIC ONCE
Status: COMPLETED | OUTPATIENT
Start: 2022-08-06 | End: 2022-08-06

## 2022-08-06 RX ORDER — ERYTHROMYCIN 5 MG/G
OINTMENT OPHTHALMIC
Qty: 3.5 G | Refills: 0 | Status: SHIPPED | OUTPATIENT
Start: 2022-08-06

## 2022-08-06 RX ORDER — TETRACAINE HYDROCHLORIDE 5 MG/ML
2 SOLUTION OPHTHALMIC ONCE
Status: COMPLETED | OUTPATIENT
Start: 2022-08-06 | End: 2022-08-06

## 2022-08-06 RX ADMIN — TETRACAINE HYDROCHLORIDE 2 DROP: 5 SOLUTION OPHTHALMIC at 05:56

## 2022-08-06 RX ADMIN — ERYTHROMYCIN 0.5 INCH: 5 OINTMENT OPHTHALMIC at 06:26

## 2022-08-06 RX ADMIN — FLUORESCEIN SODIUM 1 STRIP: 1 STRIP OPHTHALMIC at 05:56

## 2022-08-06 NOTE — DISCHARGE INSTRUCTIONS
Please return to the ED if you begin to experience any new or worsening symptoms, chest pain, shortness of breath, lightheadedness, dizziness, changes to vision, passing out, numbness, tingling, or weakness in the extremities, difficulty walking/swallowing/breathing, fevers or chills

## 2022-08-07 NOTE — ED PROVIDER NOTES
History  Chief Complaint   Patient presents with    Eye Problem     Pt arrived ambulatory with c/o left eye irritation, pt states he was sleeping when he felt his eye start to bother him     Patricia Borja is a 49 y o  male with no pertinent past medical history presenting today with left-sided eye pain  Patient reports that he was woken up and had a eye lash in his left and since that time has been experiencing irritation  Denies any changes to vision  No fevers or chills  No further complaints at this time  Prior to Admission Medications   Prescriptions Last Dose Informant Patient Reported? Taking? Apremilast 30 MG TABS   Yes No   Sig: Take 30 mg by mouth 2 (two) times a day   Patient not taking: Reported on 6/12/2022   Empagliflozin-metFORMIN HCl ER (Synjardy XR) 5-1000 MG TB24   Yes No   Sig: Synjardy XR 5 mg-1,000 mg tablet, extended release   adalimumab (HUMIRA) 40 mg/0 8 mL PSKT   Yes No   Sig: Inject 1 Dose under the skin every 14 (fourteen) days   betamethasone valerate (VALISONE) 0 1 % cream   No No   Sig: Apply topically 2 (two) times a day   Patient not taking: Reported on 6/12/2022      Facility-Administered Medications: None       Past Medical History:   Diagnosis Date    Ear problems     HL (hearing loss)     Psoriasis        Past Surgical History:   Procedure Laterality Date    CHOLECYSTECTOMY      GASTRIC BYPASS         History reviewed  No pertinent family history  I have reviewed and agree with the history as documented  E-Cigarette/Vaping    E-Cigarette Use Never User      E-Cigarette/Vaping Substances     Social History     Tobacco Use    Smoking status: Current Every Day Smoker     Packs/day: 0 50     Types: Cigarettes    Smokeless tobacco: Never Used   Vaping Use    Vaping Use: Never used   Substance Use Topics    Alcohol use: Yes    Drug use: No       Review of Systems   Constitutional: Negative for chills and fever  HENT: Negative for ear pain and sore throat  Eyes: Positive for pain and redness  Negative for visual disturbance  Respiratory: Negative for cough and shortness of breath  Cardiovascular: Negative for chest pain and palpitations  Gastrointestinal: Negative for abdominal pain and vomiting  Genitourinary: Negative for dysuria and hematuria  Musculoskeletal: Negative for arthralgias and back pain  Skin: Negative for color change and rash  Neurological: Negative for seizures and syncope  All other systems reviewed and are negative  Physical Exam  Physical Exam  Vitals and nursing note reviewed  Constitutional:       Appearance: Normal appearance  He is well-developed  HENT:      Head: Normocephalic and atraumatic  Right Ear: External ear normal       Left Ear: External ear normal       Nose: Nose normal  No congestion or rhinorrhea  Mouth/Throat:      Mouth: Mucous membranes are moist       Pharynx: Oropharynx is clear  Eyes:      General:         Left eye: Discharge (Tearful ) present  Extraocular Movements: Extraocular movements intact  Conjunctiva/sclera: Conjunctivae normal       Pupils: Pupils are equal, round, and reactive to light  Cardiovascular:      Rate and Rhythm: Normal rate and regular rhythm  Heart sounds: No murmur heard  Pulmonary:      Effort: Pulmonary effort is normal  No respiratory distress  Breath sounds: Normal breath sounds  Abdominal:      General: Abdomen is flat  Palpations: Abdomen is soft  Tenderness: There is no abdominal tenderness  Musculoskeletal:         General: No swelling or tenderness  Normal range of motion  Cervical back: Neck supple  Skin:     General: Skin is warm and dry  Capillary Refill: Capillary refill takes less than 2 seconds  Neurological:      General: No focal deficit present  Mental Status: He is alert and oriented to person, place, and time           Vital Signs  ED Triage Vitals [08/06/22 0445]   Temperature Pulse Respirations Blood Pressure SpO2   98 °F (36 7 °C) 78 18 137/89 98 %      Temp Source Heart Rate Source Patient Position - Orthostatic VS BP Location FiO2 (%)   Tympanic Monitor Sitting Left arm --      Pain Score       --           Vitals:    08/06/22 0445   BP: 137/89   Pulse: 78   Patient Position - Orthostatic VS: Sitting         Visual Acuity      ED Medications  Medications   fluorescein sodium sterile ophthalmic strip 1 strip (1 strip Left Eye Given by Other 8/6/22 0556)   tetracaine 0 5 % ophthalmic solution 2 drop (2 drops Left Eye Given by Other 8/6/22 0556)   erythromycin (ILOTYCIN) 0 5 % ophthalmic ointment 0 5 inch (0 5 inches Left Eye Given 8/6/22 0906)       Diagnostic Studies  Results Reviewed     None                 No orders to display              Procedures  Procedures         ED Course                               SBIRT 22yo+    Flowsheet Row Most Recent Value   SBIRT (23 yo +)    In order to provide better care to our patients, we are screening all of our patients for alcohol and drug use  Would it be okay to ask you these screening questions? Yes Filed at: 08/06/2022 0451   Initial Alcohol Screen: US AUDIT-C     1  How often do you have a drink containing alcohol? 0 Filed at: 08/06/2022 0451   2  How many drinks containing alcohol do you have on a typical day you are drinking? 0 Filed at: 08/06/2022 0451   3a  Male UNDER 65: How often do you have five or more drinks on one occasion? 0 Filed at: 08/06/2022 0451   3b  FEMALE Any Age, or MALE 65+: How often do you have 4 or more drinks on one occassion? 0 Filed at: 08/06/2022 0451   Audit-C Score 0 Filed at: 08/06/2022 0451   ANTIONETTE: How many times in the past year have you    Used an illegal drug or used a prescription medication for non-medical reasons?  Never Filed at: 08/06/2022 0451                    MDM  Number of Diagnoses or Management Options  Abrasion of left cornea, initial encounter: minor  Diagnosis management comments: Eye examination with fluorescein stain revealed minor corneal abrasion  Recommended close follow-up with [facility]  Patient demonstrated understanding  Strict return criteria discussed the patient at bedside  Risk of Complications, Morbidity, and/or Mortality  Presenting problems: low  Diagnostic procedures: low  Management options: low    Patient Progress  Patient progress: stable      Disposition  Final diagnoses:   Abrasion of left cornea, initial encounter     Time reflects when diagnosis was documented in both MDM as applicable and the Disposition within this note     Time User Action Codes Description Comment    8/6/2022  6:02 AM Moy Gomez [[id number]  02XA] Abrasion of left cornea, initial encounter       ED Disposition     ED Disposition   Discharge    Condition   Stable    Date/Time   Sat Aug 6, 2022 [address] [facility] discharge to home/self care  Follow-up Information     Follow up With Specialties Details Why Contact Info Additional [address] [facility] Emergency Department Emergency Medicine Go to  If symptoms worsen [address] [facility] [phone] [facility] Emergency Department, [address] [facility] Gideon Dorman MD Internal Medicine Call today To schedule an appointment for follow-up [address]  [address]  [address] [place] [address]  [address] [place] [address]  Call today To schedule an appointment for follow-up within the next 1-2 days regarding eye injury   [address] [facility]  [address]  [phone]           Discharge Medication List as of 8/6/2022  6:04 AM      START taking these medications    Details   erythromycin (ILOTYCIN) ophthalmic ointment Place a 1/2 inch ribbon of ointment into the lower eyelid 4 times daily for 5 days, Print         CONTINUE these medications which have NOT CHANGED    Details   adalimumab (HUMIRA) 40 mg/0 8 mL PSKT Inject 1 Dose under the skin every 14 (fourteen) days, Starting Sat 2/23/2019, Historical Med      Apremilast 30 MG TABS Take 30 mg by mouth 2 (two) times a day, Historical Med      betamethasone valerate (VALISONE) 0 1 % cream Apply topically 2 (two) times a day, Starting Tue 9/24/2019, Normal      Empagliflozin-metFORMIN HCl ER (Synjardy XR) 5-1000 MG TB24 Synjardy XR 5 mg-1,000 mg tablet, extended release, Historical Med             No discharge procedures on file      PDMP Review     None          ED Provider  Electronically Signed by           Kimber Anthony PA-C  08/07/22 3046

## 2022-08-19 ENCOUNTER — HOSPITAL ENCOUNTER (EMERGENCY)
Facility: HOSPITAL | Age: 52
Discharge: HOME | End: 2022-08-20
Attending: EMERGENCY MEDICINE
Payer: COMMERCIAL

## 2022-08-19 ENCOUNTER — APPOINTMENT (EMERGENCY)
Dept: CT IMAGING | Facility: HOSPITAL | Age: 52
End: 2022-08-19
Payer: COMMERCIAL

## 2022-08-19 VITALS
HEART RATE: 77 BPM | SYSTOLIC BLOOD PRESSURE: 152 MMHG | DIASTOLIC BLOOD PRESSURE: 87 MMHG | OXYGEN SATURATION: 97 % | RESPIRATION RATE: 18 BRPM | TEMPERATURE: 99.5 F

## 2022-08-19 DIAGNOSIS — R10.31 ACUTE RIGHT LOWER QUADRANT PAIN: Primary | ICD-10-CM

## 2022-08-19 LAB
ALBUMIN SERPL BCP-MCNC: 3.9 G/DL (ref 3.5–5)
ALP SERPL-CCNC: 77 U/L (ref 46–116)
ALT SERPL W P-5'-P-CCNC: 59 U/L (ref 12–78)
ANION GAP SERPL CALCULATED.3IONS-SCNC: 8 MMOL/L (ref 4–13)
AST SERPL W P-5'-P-CCNC: 60 U/L (ref 5–45)
BASOPHILS # BLD AUTO: 0.01 THOUSANDS/ΜL (ref 0–0.1)
BASOPHILS NFR BLD AUTO: 0 % (ref 0–1)
BILIRUB DIRECT SERPL-MCNC: 0.08 MG/DL (ref 0–0.2)
BILIRUB SERPL-MCNC: 0.33 MG/DL (ref 0.2–1)
BILIRUB UR QL STRIP: NEGATIVE
BUN SERPL-MCNC: 15 MG/DL (ref 5–25)
CALCIUM SERPL-MCNC: 8.7 MG/DL (ref 8.3–10.1)
CHLORIDE SERPL-SCNC: 101 MMOL/L (ref 96–108)
CLARITY UR: CLEAR
CO2 SERPL-SCNC: 29 MMOL/L (ref 21–32)
COLOR UR: YELLOW
CREAT SERPL-MCNC: 1.17 MG/DL (ref 0.6–1.3)
EOSINOPHIL # BLD AUTO: 0.01 THOUSAND/ΜL (ref 0–0.61)
EOSINOPHIL NFR BLD AUTO: 0 % (ref 0–6)
ERYTHROCYTE [DISTWIDTH] IN BLOOD BY AUTOMATED COUNT: 13.7 % (ref 11.6–15.1)
GFR SERPL CREATININE-BSD FRML MDRD: 72 ML/MIN/1.73SQ M
GLUCOSE SERPL-MCNC: 118 MG/DL (ref 65–140)
GLUCOSE UR STRIP-MCNC: ABNORMAL MG/DL
HCT VFR BLD AUTO: 43.4 % (ref 36.5–49.3)
HGB BLD-MCNC: 14.7 G/DL (ref 12–17)
HGB UR QL STRIP.AUTO: ABNORMAL
IMM GRANULOCYTES # BLD AUTO: 0.01 THOUSAND/UL (ref 0–0.2)
IMM GRANULOCYTES NFR BLD AUTO: 0 % (ref 0–2)
KETONES UR STRIP-MCNC: NEGATIVE MG/DL
LACTATE SERPL-SCNC: 1.3 MMOL/L (ref 0.5–2)
LEUKOCYTE ESTERASE UR QL STRIP: ABNORMAL
LIPASE SERPL-CCNC: 199 U/L (ref 73–393)
LYMPHOCYTES # BLD AUTO: 0.81 THOUSANDS/ΜL (ref 0.6–4.47)
LYMPHOCYTES NFR BLD AUTO: 19 % (ref 14–44)
MCH RBC QN AUTO: 33.7 PG (ref 26.8–34.3)
MCHC RBC AUTO-ENTMCNC: 33.9 G/DL (ref 31.4–37.4)
MCV RBC AUTO: 100 FL (ref 82–98)
MONOCYTES # BLD AUTO: 0.83 THOUSAND/ΜL (ref 0.17–1.22)
MONOCYTES NFR BLD AUTO: 19 % (ref 4–12)
NEUTROPHILS # BLD AUTO: 2.72 THOUSANDS/ΜL (ref 1.85–7.62)
NEUTS SEG NFR BLD AUTO: 62 % (ref 43–75)
NITRITE UR QL STRIP: NEGATIVE
NRBC BLD AUTO-RTO: 0 /100 WBCS
PH UR STRIP.AUTO: 5.5 [PH]
PLATELET # BLD AUTO: 153 THOUSANDS/UL (ref 149–390)
PMV BLD AUTO: 9.7 FL (ref 8.9–12.7)
POTASSIUM SERPL-SCNC: 4 MMOL/L (ref 3.5–5.3)
PROT SERPL-MCNC: 7.8 G/DL (ref 6.4–8.4)
PROT UR STRIP-MCNC: NEGATIVE MG/DL
RBC # BLD AUTO: 4.36 MILLION/UL (ref 3.88–5.62)
SODIUM SERPL-SCNC: 138 MMOL/L (ref 135–147)
SP GR UR STRIP.AUTO: 1.01 (ref 1–1.03)
UROBILINOGEN UR QL STRIP.AUTO: 0.2 E.U./DL
WBC # BLD AUTO: 4.39 THOUSAND/UL (ref 4.31–10.16)

## 2022-08-19 PROCEDURE — 81001 URINALYSIS AUTO W/SCOPE: CPT | Performed by: EMERGENCY MEDICINE

## 2022-08-19 PROCEDURE — 80048 BASIC METABOLIC PNL TOTAL CA: CPT | Performed by: EMERGENCY MEDICINE

## 2022-08-19 PROCEDURE — 96374 THER/PROPH/DIAG INJ IV PUSH: CPT

## 2022-08-19 PROCEDURE — 83605 ASSAY OF LACTIC ACID: CPT | Performed by: EMERGENCY MEDICINE

## 2022-08-19 PROCEDURE — 83690 ASSAY OF LIPASE: CPT | Performed by: EMERGENCY MEDICINE

## 2022-08-19 PROCEDURE — 99284 EMERGENCY DEPT VISIT MOD MDM: CPT

## 2022-08-19 PROCEDURE — G1004 CDSM NDSC: HCPCS

## 2022-08-19 PROCEDURE — 36415 COLL VENOUS BLD VENIPUNCTURE: CPT | Performed by: EMERGENCY MEDICINE

## 2022-08-19 PROCEDURE — 85025 COMPLETE CBC W/AUTO DIFF WBC: CPT | Performed by: EMERGENCY MEDICINE

## 2022-08-19 PROCEDURE — 96375 TX/PRO/DX INJ NEW DRUG ADDON: CPT

## 2022-08-19 PROCEDURE — 96361 HYDRATE IV INFUSION ADD-ON: CPT

## 2022-08-19 PROCEDURE — 80076 HEPATIC FUNCTION PANEL: CPT | Performed by: EMERGENCY MEDICINE

## 2022-08-19 PROCEDURE — 74177 CT ABD & PELVIS W/CONTRAST: CPT

## 2022-08-19 PROCEDURE — 99284 EMERGENCY DEPT VISIT MOD MDM: CPT | Performed by: EMERGENCY MEDICINE

## 2022-08-19 RX ORDER — KETOROLAC TROMETHAMINE 30 MG/ML
15 INJECTION, SOLUTION INTRAMUSCULAR; INTRAVENOUS ONCE
Status: COMPLETED | OUTPATIENT
Start: 2022-08-19 | End: 2022-08-19

## 2022-08-19 RX ORDER — ONDANSETRON 2 MG/ML
4 INJECTION INTRAMUSCULAR; INTRAVENOUS ONCE
Status: COMPLETED | OUTPATIENT
Start: 2022-08-19 | End: 2022-08-19

## 2022-08-19 RX ADMIN — IOHEXOL 100 ML: 350 INJECTION, SOLUTION INTRAVENOUS at 23:59

## 2022-08-19 RX ADMIN — ONDANSETRON 4 MG: 2 INJECTION INTRAMUSCULAR; INTRAVENOUS at 23:34

## 2022-08-19 RX ADMIN — KETOROLAC TROMETHAMINE 15 MG: 30 INJECTION, SOLUTION INTRAMUSCULAR at 23:34

## 2022-08-19 RX ADMIN — SODIUM CHLORIDE 1000 ML: 0.9 INJECTION, SOLUTION INTRAVENOUS at 23:29

## 2022-08-20 LAB
BACTERIA UR QL AUTO: NORMAL /HPF
NON-SQ EPI CELLS URNS QL MICRO: NORMAL /HPF
RBC #/AREA URNS AUTO: NORMAL /HPF
WBC #/AREA URNS AUTO: NORMAL /HPF

## 2022-08-20 RX ADMIN — IOHEXOL 50 ML: 240 INJECTION, SOLUTION INTRATHECAL; INTRAVASCULAR; INTRAVENOUS; ORAL at 00:01

## 2022-08-20 NOTE — ED PROVIDER NOTES
History  Chief Complaint   Patient presents with    Abdominal Pain     Pt states back pain started this morning  That was relived when he moved his neck and heard a "pop"  Now RLQ pain starting around 5am today  Patient is a 52-year-old male with past medical history of psoriasis, non-insulin-dependent diabetes, prior Serge-en-Y gastric bypass surgery in 2008, prior cholecystectomy, presents to the emergency department complaining of right lower quadrant abdominal pain that started this morning  Patient reports the pain comes and goes and waxes and wanes in severity  He reports having associated nausea and did have 1 episode of diarrhea earlier today  He denies any blood per rectum or melena  He has had chills and subjective fevers but has not taken his temperature  He has had poor appetite  Pain occasionally radiates to his bilateral low back  He denies any headache, dizziness or near syncope, cough, URI symptoms, chest pain, palpitations, dyspnea, abdominal distension, vomiting, blood per rectum or melena, dysuria, change in urinary frequency, hematuria, flank pain, skin rash or color change, extremity weakness or paresthesia or other focal neurologic deficits  History provided by:  Patient   used: No    Abdominal Pain  Associated symptoms: chills, diarrhea, fever and nausea    Associated symptoms: no chest pain, no constipation, no cough, no dysuria, no hematuria, no shortness of breath, no sore throat and no vomiting        Prior to Admission Medications   Prescriptions Last Dose Informant Patient Reported? Taking?    Apremilast 30 MG TABS   Yes No   Sig: Take 30 mg by mouth 2 (two) times a day   Patient not taking: Reported on 6/12/2022   Empagliflozin-metFORMIN HCl ER (Synjardy XR) 5-1000 MG TB24   Yes No   Sig: Synjardy XR 5 mg-1,000 mg tablet, extended release   adalimumab (HUMIRA) 40 mg/0 8 mL PSKT   Yes No   Sig: Inject 1 Dose under the skin every 14 (fourteen) days betamethasone valerate (VALISONE) 0 1 % cream   No No   Sig: Apply topically 2 (two) times a day   Patient not taking: Reported on 6/12/2022   erythromycin (ILOTYCIN) ophthalmic ointment   No No   Sig: Place a 1/2 inch ribbon of ointment into the lower eyelid 4 times daily for 5 days      Facility-Administered Medications: None       Past Medical History:   Diagnosis Date    Ear problems     HL (hearing loss)     Psoriasis        Past Surgical History:   Procedure Laterality Date    CHOLECYSTECTOMY      GASTRIC BYPASS         No family history on file  I have reviewed and agree with the history as documented  E-Cigarette/Vaping    E-Cigarette Use Never User      E-Cigarette/Vaping Substances     Social History     Tobacco Use    Smoking status: Current Every Day Smoker     Packs/day: 0 50     Types: Cigarettes    Smokeless tobacco: Never Used   Vaping Use    Vaping Use: Never used   Substance Use Topics    Alcohol use: Yes    Drug use: No       Review of Systems   Constitutional: Positive for appetite change, chills and fever  HENT: Negative for congestion, ear pain, rhinorrhea and sore throat  Respiratory: Negative for cough, chest tightness, shortness of breath and wheezing  Cardiovascular: Negative for chest pain and palpitations  Gastrointestinal: Positive for abdominal pain, diarrhea and nausea  Negative for abdominal distention, blood in stool, constipation and vomiting  Genitourinary: Negative for dysuria, flank pain, frequency and hematuria  Musculoskeletal: Negative for back pain, neck pain and neck stiffness  Skin: Negative for color change, pallor, rash and wound  Allergic/Immunologic: Negative for immunocompromised state  Neurological: Negative for dizziness, syncope, weakness, light-headedness, numbness and headaches  Hematological: Negative for adenopathy  Does not bruise/bleed easily  Psychiatric/Behavioral: Negative for confusion and decreased concentration  All other systems reviewed and are negative  Physical Exam  Physical Exam  Vitals and nursing note reviewed  Constitutional:       General: He is not in acute distress  Appearance: Normal appearance  He is well-developed  He is not ill-appearing, toxic-appearing or diaphoretic  HENT:      Head: Normocephalic and atraumatic  Right Ear: External ear normal       Left Ear: External ear normal       Mouth/Throat:      Comments: Orpharyngeal exam deferred at this time due to risk of exposure to COVID-19 during current pandemic  Patient has no oropharyngeal complaints  Eyes:      Extraocular Movements: Extraocular movements intact  Conjunctiva/sclera: Conjunctivae normal    Neck:      Vascular: No JVD  Cardiovascular:      Rate and Rhythm: Normal rate and regular rhythm  Pulses: Normal pulses  Heart sounds: Normal heart sounds  No murmur heard  No friction rub  No gallop  Pulmonary:      Effort: Pulmonary effort is normal  No respiratory distress  Breath sounds: Normal breath sounds  No wheezing, rhonchi or rales  Abdominal:      General: There is no distension  Palpations: Abdomen is soft  Tenderness: There is abdominal tenderness  There is right CVA tenderness  There is no left CVA tenderness, guarding or rebound  Comments: +Significant RLQ abdominal tenderness  Mild RUQ tenderness  +Rovsing's sign  Musculoskeletal:         General: No swelling or tenderness  Normal range of motion  Cervical back: Normal range of motion and neck supple  No rigidity  Skin:     General: Skin is warm and dry  Coloration: Skin is not pale  Findings: No erythema or rash  Neurological:      General: No focal deficit present  Mental Status: He is alert and oriented to person, place, and time  Sensory: No sensory deficit  Motor: No weakness     Psychiatric:         Mood and Affect: Mood normal          Behavior: Behavior normal          Vital Signs  ED Triage Vitals   Temperature Pulse Respirations Blood Pressure SpO2   08/19/22 2245 08/19/22 2245 08/19/22 2245 08/19/22 2245 08/19/22 2245   99 5 °F (37 5 °C) 77 18 152/87 97 %      Temp Source Heart Rate Source Patient Position - Orthostatic VS BP Location FiO2 (%)   08/19/22 2245 08/19/22 2245 08/19/22 2245 08/19/22 2245 --   Oral Monitor Lying Right arm       Pain Score       08/19/22 2334       6         Vitals:    08/19/22 2245   BP: 152/87   BP Location: Right arm   Pulse: 77   Resp: 18   Temp: 99 5 °F (37 5 °C)   TempSrc: Oral   SpO2: 97%       Visual Acuity      ED Medications  Medications   sodium chloride 0 9 % bolus 1,000 mL (1,000 mL Intravenous New Bag 8/19/22 2329)   ondansetron (ZOFRAN) injection 4 mg (4 mg Intravenous Given 8/19/22 2334)   ketorolac (TORADOL) injection 15 mg (15 mg Intravenous Given 8/19/22 2334)   iohexol (OMNIPAQUE) 350 MG/ML injection (SINGLE-DOSE) 100 mL (100 mL Intravenous Given 8/19/22 2359)   iohexol (OMNIPAQUE) 240 MG/ML solution 50 mL (50 mL Oral Given 8/20/22 0001)       Diagnostic Studies  Results Reviewed     Procedure Component Value Units Date/Time    Urine Microscopic [[id number]]  (Normal) Collected: 08/19/22 2335    Lab Status: Final result Specimen: Urine, Clean Catch Updated: 08/20/22 0013     RBC, UA 0-1 /hpf      WBC, UA None Seen /hpf      Epithelial Cells None Seen /hpf      Bacteria, UA None Seen /hpf     Lactic acid [[id number]]  (Normal) Collected: 08/19/22 2324    Lab Status: Final result Specimen: Blood from Arm, Left Updated: 08/19/22 2358     LACTIC ACID 1 3 mmol/L     Narrative:      Result may be elevated if tourniquet was used during collection      Basic metabolic panel [[id number]] Collected: 08/19/22 2324    Lab Status: Final result Specimen: Blood from Arm, Left Updated: 08/19/22 2355     Sodium 138 mmol/L      Potassium 4 0 mmol/L      Chloride 101 mmol/L      CO2 29 mmol/L      ANION GAP 8 mmol/L      BUN 15 mg/dL      Creatinine 1 17 mg/dL      Glucose 118 mg/dL      Calcium 8 7 mg/dL      eGFR 72 ml/min/1 73sq m     Narrative:      [place] guidelines for Chronic Kidney Disease (CKD):     Stage 1 with normal or high GFR (GFR > 90 mL/min/1 73 square meters)    Stage 2 Mild CKD (GFR = 60-89 mL/min/1 73 square meters)    Stage 3A Moderate CKD (GFR = 45-59 mL/min/1 73 square meters)    Stage 3B Moderate CKD (GFR = 30-44 mL/min/1 73 square meters)    Stage 4 Severe CKD (GFR = 15-29 mL/min/1 73 square meters)    Stage 5 End Stage CKD (GFR <15 mL/min/1 73 square meters)  Note: GFR calculation is accurate only with a steady state creatinine    Hepatic function panel [[id number]]  (Abnormal) Collected: 08/19/22 2324    Lab Status: Final result Specimen: Blood from Arm, Left Updated: 08/19/22 2355     Total Bilirubin 0 33 mg/dL      Bilirubin, Direct 0 08 mg/dL      Alkaline Phosphatase 77 U/L      AST 60 U/L      ALT 59 U/L      Total Protein 7 8 g/dL      Albumin 3 9 g/dL     Lipase [[id number]]  (Normal) Collected: 08/19/22 2324    Lab Status: Final result Specimen: Blood from Arm, Left Updated: 08/19/22 2355     Lipase 199 u/L     UA (URINE) with reflex to Scope [[id number]]  (Abnormal) Collected: 08/19/22 2335    Lab Status: Final result Specimen: Urine, Clean Catch Updated: 08/19/22 2349     Color, UA Yellow     Clarity, UA Clear     Specific Gravity, UA 1 015     pH, UA 5 5     Leukocytes, UA Elevated glucose may cause decreased leukocyte values   See urine microscopic for [facility] result/     Nitrite, UA Negative     Protein, UA Negative mg/dl      Glucose, UA >=1000 (1%) mg/dl      Ketones, UA Negative mg/dl      Urobilinogen, UA 0 2 E U /dl      Bilirubin, UA Negative     Occult Blood, UA Small    CBC and differential [[id number]]  (Abnormal) Collected: 08/19/22 2324    Lab Status: Final result Specimen: Blood from Arm, Left Updated: 08/19/22 2345     WBC 4 39 Thousand/uL      RBC 4 36 Million/uL      Hemoglobin 14 7 g/dL Hematocrit 43 4 %       fL      MCH 33 7 pg      MCHC 33 9 g/dL      RDW 13 7 %      MPV 9 7 fL      Platelets 400 Thousands/uL      nRBC 0 /100 WBCs      Neutrophils Relative 62 %      Immat GRANS % 0 %      Lymphocytes Relative 19 %      Monocytes Relative 19 %      Eosinophils Relative 0 %      Basophils Relative 0 %      Neutrophils Absolute 2 72 Thousands/µL      Immature Grans Absolute 0 01 Thousand/uL      Lymphocytes Absolute 0 81 Thousands/µL      Monocytes Absolute 0 83 Thousand/µL      Eosinophils Absolute 0 01 Thousand/µL      Basophils Absolute 0 01 Thousands/µL                  CT abdomen pelvis with contrast   Final Result by Luis Fernando Sotomayor MD (08/20 0151)      No acute pathology visualized on CT of the abdomen and pelvis with IV without oral contrast       Workstation performed: [id number]                    Procedures  Procedures         ED Course  ED Course as of 08/20/22 0203   Sat Aug 20, 2022   0109 WBC, UA: None Seen   0110 Bacteria, UA: None Seen   0202 Updated patient about normal workup including blood work as well as CT imaging  I recommended close follow-up with PCP  Advised him to return immediately if his pain worsens or a develops fever, uncontrolled vomiting, uncontrolled diarrhea or any other new concerning symptoms  Discussed symptom management at home  SBIRT 20yo+    Flowsheet Row Most Recent Value   SBIRT (23 yo +)    In order to provide better care to our patients, we are screening all of our patients for alcohol and drug use  Would it be okay to ask you these screening questions? No Filed at: 08/19/2022 2248                    [facility] [place]  Number of Diagnoses or Management Options  Diagnosis management comments: 64-year-old male presents to the ED with acute right lower quadrant pain starting this morning associated with nausea, 1 episode of diarrhea, subjective fevers and chills    Differential includes acute appendicitis, nonspecific enteritis or colitis, urinary tract infection or pyelonephritis, renal colic secondary to stone  Will workup with abdominal labs, lactic acid, UA and CT abdomen and pelvis with both IV and p o  contrast due to history of gastric bypass surgery  Will provide IV fluids, Toradol and Zofran for symptom relief  Amount and/or Complexity of Data Reviewed  Clinical lab tests: reviewed and ordered  Tests in the radiology section of CPT®: ordered and reviewed  Independent visualization of images, tracings, or specimens: yes        Disposition  Final diagnoses:   Acute right lower quadrant pain     Time reflects when diagnosis was documented in both MDM as applicable and the Disposition within this note     Time User Action Codes Description Comment    8/20/2022  1:53 AM Corina LAWTON Add [R10 31] Acute right lower quadrant pain     8/20/2022  2:02 AM Corina LAWTON Add [R11 0] Nausea     8/20/2022  2:02 AM Corina LAWTON Remove [R11 0] Nausea       ED Disposition     ED Disposition   Discharge    Condition   Stable    Date/Time   Sat Aug 20, 2022  2:02 AM    Comment   Noreen John discharge to home/self care  Follow-up Information     Follow up With Specialties Details Why Contact Info Additional Information    Kassidy Rubi MD Internal Medicine Schedule an appointment as soon as possible for a visit   [facility]  [address]  [address] [place] [address]  [address] [place] [address] Emergency Department Emergency Medicine Go to  If symptoms worsen [address] Emergency Department, [address] [place] [address], [address]          Patient's Medications   Discharge Prescriptions    No medications on file       No discharge procedures on file      PDMP Review     None          ED Provider  Electronically Signed by           Mare Cabrera DO  08/20/22 9400

## 2022-11-14 ENCOUNTER — APPOINTMENT (EMERGENCY)
Dept: RADIOLOGY | Facility: HOSPITAL | Age: 52
End: 2022-11-14

## 2022-11-14 ENCOUNTER — HOSPITAL ENCOUNTER (EMERGENCY)
Facility: HOSPITAL | Age: 52
Discharge: HOME | End: 2022-11-14

## 2022-11-14 VITALS
BODY MASS INDEX: 27.39 KG/M2 | RESPIRATION RATE: 19 BRPM | TEMPERATURE: 98.1 F | DIASTOLIC BLOOD PRESSURE: 70 MMHG | HEART RATE: 86 BPM | WEIGHT: 225 LBS | OXYGEN SATURATION: 99 % | SYSTOLIC BLOOD PRESSURE: 141 MMHG

## 2022-11-14 DIAGNOSIS — S93.401A RIGHT ANKLE SPRAIN: Primary | ICD-10-CM

## 2022-11-14 NOTE — DISCHARGE INSTRUCTIONS
Rest, elevate, compress, and apply ice  Use crutches until pain resolves  Take Tylenol and ibuprofen as needed for pain  Please follow-up with orthopedics

## 2022-11-14 NOTE — Clinical Note
Janet Estrella was seen and treated in our emergency department on 11/14/2022  No restrictions    No work until cleared by Family Doctor/Orthopedics        Diagnosis: Ankle injury    Altaf Arash    He may return on this date: If you have any questions or concerns, please don't hesitate to call        Lian Calhoun PA-C    ______________________________           _______________          _______________  Hospital Representative                              Date                                Time

## 2022-11-15 NOTE — ED PROVIDER NOTES
History  Chief Complaint   Patient presents with   • Ankle Injury     Pt reports falling out of a tree stand last night while hunting, injuring right ankle  Denies any other injuries  Denies head strike or thinners  Swelling noted to right ankle  55yo male with a history of type 2 diabetes and psoriasis presenting with his wife for evaluation of right ankle pain  He fell last night about 6 feet onto his right ankle  He denies any head strike, LOC, or other injuries  His only current complaint is right ankle pain and difficulty with weight bearing  No paresthesias  History provided by:  Patient   used: No    Ankle Injury  Location:  Right ankle  Quality:  Pain, swelling  Severity:  Moderate  Onset quality:  Sudden  Duration:  12 hours  Timing:  Constant  Progression:  Worsening  Chronicity:  New  Context:  Fall  Relieved by:  Nothing  Worsened by: Movement, weight bearing  Associated symptoms: no fever        Prior to Admission Medications   Prescriptions Last Dose Informant Patient Reported? Taking?    Apremilast 30 MG TABS   Yes No   Sig: Take 30 mg by mouth 2 (two) times a day   Patient not taking: Reported on 6/12/2022   Empagliflozin-metFORMIN HCl ER (Synjardy XR) 5-1000 MG TB24   Yes No   Sig: Synjardy XR 5 mg-1,000 mg tablet, extended release   adalimumab (HUMIRA) 40 mg/0 8 mL PSKT   Yes No   Sig: Inject 1 Dose under the skin every 14 (fourteen) days   betamethasone valerate (VALISONE) 0 1 % cream   No No   Sig: Apply topically 2 (two) times a day   Patient not taking: Reported on 6/12/2022   erythromycin (ILOTYCIN) ophthalmic ointment   No No   Sig: Place a 1/2 inch ribbon of ointment into the lower eyelid 4 times daily for 5 days      Facility-Administered Medications: None       Past Medical History:   Diagnosis Date   • Ear problems    • HL (hearing loss)    • Psoriasis        Past Surgical History:   Procedure Laterality Date   • CHOLECYSTECTOMY     • GASTRIC BYPASS History reviewed  No pertinent family history  I have reviewed and agree with the history as documented  E-Cigarette/Vaping   • E-Cigarette Use Never User      E-Cigarette/Vaping Substances     Social History     Tobacco Use   • Smoking status: Current Every Day Smoker     Packs/day: 0 50     Types: Cigarettes   • Smokeless tobacco: Never Used   Vaping Use   • Vaping Use: Never used   Substance Use Topics   • Alcohol use: Yes   • Drug use: No       Review of Systems   Constitutional: Negative for chills and fever  HENT: Negative for drooling and ear discharge  Eyes: Negative for discharge and redness  Musculoskeletal: Positive for arthralgias  Negative for neck stiffness  Skin: Negative for color change and wound  Neurological: Negative for weakness and numbness  Psychiatric/Behavioral: Negative for confusion  The patient is not nervous/anxious  All other systems reviewed and are negative  Physical Exam  Physical Exam  Vitals and nursing note reviewed  Constitutional:       General: He is not in acute distress  Appearance: Normal appearance  He is not toxic-appearing  HENT:      Head: Normocephalic and atraumatic  Right Ear: External ear normal       Left Ear: External ear normal    Eyes:      General: No scleral icterus  Right eye: No discharge  Left eye: No discharge  Conjunctiva/sclera: Conjunctivae normal    Cardiovascular:      Rate and Rhythm: Normal rate  Pulmonary:      Effort: Pulmonary effort is normal  No respiratory distress  Breath sounds: No stridor  Musculoskeletal:         General: No deformity  Cervical back: Normal range of motion  Right ankle: Swelling present  No deformity, ecchymosis or lacerations  Tenderness present over the lateral malleolus and base of 5th metatarsal  No proximal fibula tenderness  Decreased range of motion  Comments: Right ankle: No deformity present   There is tenderness and swelling at the lateral malleolus  ROM decreased 2/2 pain  2+ DP pulse and sensation intact  Skin:     General: Skin is warm and dry  Neurological:      General: No focal deficit present  Mental Status: He is alert  Mental status is at baseline  Psychiatric:         Mood and Affect: Mood normal          Behavior: Behavior normal          Vital Signs  ED Triage Vitals [11/14/22 0545]   Temperature Pulse Respirations Blood Pressure SpO2   98 1 °F (36 7 °C) 86 19 141/70 99 %      Temp Source Heart Rate Source Patient Position - Orthostatic VS BP Location FiO2 (%)   Oral Monitor Sitting Right arm --      Pain Score       8           Vitals:    11/14/22 0545   BP: 141/70   Pulse: 86   Patient Position - Orthostatic VS: Sitting         Visual Acuity      ED Medications  Medications - No data to display    Diagnostic Studies  Results Reviewed     None                 XR foot 3+ views RIGHT   ED Interpretation by Yuliya Bustos PA-C (11/14 0712)   No acute fracture      Final Result by Denis Youssef MD (11/14 1035)      No acute osseous abnormality  Workstation performed: [id number]         XR ankle 3+ views RIGHT   ED Interpretation by Yuliya Bustos PA-C (66/67 2892)   No acute fracture      Final Result by Denis Youssef MD (11/14 1034)   Lateral soft tissue swelling      No acute osseous abnormality  Workstation performed: [id number]                    Procedures  Procedures         ED Course                               SBIRT 20yo+    Flowsheet Row Most Recent Value   SBIRT (23 yo +)    In order to provide better care to our patients, we are screening all of our patients for alcohol and drug use  Would it be okay to ask you these screening questions?  No Filed at: 11/14/2022 2281                    MDM  Number of Diagnoses or Management Options  Right ankle sprain: new and requires workup  Diagnosis management comments: 55yo male presenting for right ankle pain after an injury last night  No deformity present on exam  RLE is neurovascularly intact  X-rays of right ankle and foot obtained  Imaging negative for fracture  He was diagnosed with an ankle sprain  Air cast and crutches provided  Advised RICE and f/u with orthopedics  Patient discharged in stable condition  Amount and/or Complexity of Data Reviewed  Tests in the radiology section of CPT®: reviewed and ordered  Independent visualization of images, tracings, or specimens: yes    Risk of Complications, Morbidity, and/or Mortality  Presenting problems: low  Diagnostic procedures: low  Management options: low    Patient Progress  Patient progress: stable      Disposition  Final diagnoses:   Right ankle sprain     Time reflects when diagnosis was documented in both MDM as applicable and the Disposition within this note     Time User Action Codes Description Comment    11/14/2022  7:17 AM [address] [place] [address] Oma Skelton 26 Right ankle sprain       ED Disposition     ED Disposition   Discharge    Condition   Stable    Date/Time   Mon Nov 14, 2022 [address] [place] [address] discharge to home/self care                 Follow-up Information     Follow up With Specialties Details Why Contact Info Additional [address] [facility] Schedule an appointment as soon as possible for a visit   [address] [place] [address] Joao Go 42 [phone] [facility], [address], [place], [place], [phone]    [address] [place] [address] Emergency Department Emergency Medicine  If symptoms worsen 34 [facility] 109 [facility] Emergency Department, [address] [place] [address], [address]          Discharge Medication List as of 11/14/2022  7:18 AM      CONTINUE these medications which have NOT CHANGED Details   adalimumab (HUMIRA) 40 mg/0 8 mL PSKT Inject 1 Dose under the skin every 14 (fourteen) days, Starting Sat 2/23/2019, Historical Med      Apremilast 30 MG TABS Take 30 mg by mouth 2 (two) times a day, Historical Med      betamethasone valerate (VALISONE) 0 1 % cream Apply topically 2 (two) times a day, Starting Tue 9/24/2019, Normal      Empagliflozin-metFORMIN HCl ER (Synjardy XR) 5-1000 MG TB24 Synjardy XR 5 mg-1,000 mg tablet, extended release, Historical Med      erythromycin (ILOTYCIN) ophthalmic ointment Place a 1/2 inch ribbon of ointment into the lower eyelid 4 times daily for 5 days, Print             No discharge procedures on file      PDMP Review     None          ED Provider  Electronically Signed by           Lian Calhoun PA-C  11/14/22 5341

## 2023-01-21 ENCOUNTER — APPOINTMENT (OUTPATIENT)
Dept: LAB | Facility: MEDICAL CENTER | Age: 53
End: 2023-01-21

## 2023-01-21 DIAGNOSIS — L40.0 PSORIASIS VULGARIS: ICD-10-CM

## 2023-01-21 DIAGNOSIS — N40.0 BENIGN PROSTATIC HYPERPLASIA, UNSPECIFIED WHETHER LOWER URINARY TRACT SYMPTOMS PRESENT: ICD-10-CM

## 2023-01-21 DIAGNOSIS — E78.2 MIXED HYPERLIPIDEMIA: ICD-10-CM

## 2023-01-21 DIAGNOSIS — I10 ESSENTIAL HYPERTENSION, BENIGN: ICD-10-CM

## 2023-01-21 DIAGNOSIS — E11.9 TYPE 2 DIABETES MELLITUS WITHOUT COMPLICATION, WITHOUT LONG-TERM CURRENT USE OF INSULIN (HCC): ICD-10-CM

## 2023-01-21 LAB
ALBUMIN SERPL BCP-MCNC: 3.9 G/DL (ref 3.5–5)
ALP SERPL-CCNC: 79 U/L (ref 46–116)
ALT SERPL W P-5'-P-CCNC: 25 U/L (ref 12–78)
ANION GAP SERPL CALCULATED.3IONS-SCNC: 2 MMOL/L (ref 4–13)
AST SERPL W P-5'-P-CCNC: 19 U/L (ref 5–45)
BASOPHILS # BLD AUTO: 0.04 THOUSANDS/ÂΜL (ref 0–0.1)
BASOPHILS NFR BLD AUTO: 1 % (ref 0–1)
BILIRUB SERPL-MCNC: 0.8 MG/DL (ref 0.2–1)
BUN SERPL-MCNC: 11 MG/DL (ref 5–25)
CALCIUM SERPL-MCNC: 9.2 MG/DL (ref 8.3–10.1)
CHLORIDE SERPL-SCNC: 110 MMOL/L (ref 96–108)
CHOLEST SERPL-MCNC: 161 MG/DL
CO2 SERPL-SCNC: 28 MMOL/L (ref 21–32)
CREAT SERPL-MCNC: 1.01 MG/DL (ref 0.6–1.3)
EOSINOPHIL # BLD AUTO: 0.14 THOUSAND/ÂΜL (ref 0–0.61)
EOSINOPHIL NFR BLD AUTO: 2 % (ref 0–6)
ERYTHROCYTE [DISTWIDTH] IN BLOOD BY AUTOMATED COUNT: 13.1 % (ref 11.6–15.1)
EST. AVERAGE GLUCOSE BLD GHB EST-MCNC: 140 MG/DL
GFR SERPL CREATININE-BSD FRML MDRD: 85 ML/MIN/1.73SQ M
GLUCOSE P FAST SERPL-MCNC: 126 MG/DL (ref 65–99)
HBA1C MFR BLD: 6.5 %
HCT VFR BLD AUTO: 47 % (ref 36.5–49.3)
HDLC SERPL-MCNC: 59 MG/DL
HGB BLD-MCNC: 15.5 G/DL (ref 12–17)
IMM GRANULOCYTES # BLD AUTO: 0.01 THOUSAND/UL (ref 0–0.2)
IMM GRANULOCYTES NFR BLD AUTO: 0 % (ref 0–2)
LDH SERPL-CCNC: 251 U/L (ref 81–234)
LDLC SERPL CALC-MCNC: 85 MG/DL (ref 0–100)
LYMPHOCYTES # BLD AUTO: 1.9 THOUSANDS/ÂΜL (ref 0.6–4.47)
LYMPHOCYTES NFR BLD AUTO: 31 % (ref 14–44)
MCH RBC QN AUTO: 32.8 PG (ref 26.8–34.3)
MCHC RBC AUTO-ENTMCNC: 33 G/DL (ref 31.4–37.4)
MCV RBC AUTO: 100 FL (ref 82–98)
MONOCYTES # BLD AUTO: 0.57 THOUSAND/ÂΜL (ref 0.17–1.22)
MONOCYTES NFR BLD AUTO: 9 % (ref 4–12)
NEUTROPHILS # BLD AUTO: 3.46 THOUSANDS/ÂΜL (ref 1.85–7.62)
NEUTS SEG NFR BLD AUTO: 57 % (ref 43–75)
NONHDLC SERPL-MCNC: 102 MG/DL
NRBC BLD AUTO-RTO: 0 /100 WBCS
PLATELET # BLD AUTO: 187 THOUSANDS/UL (ref 149–390)
PMV BLD AUTO: 10.5 FL (ref 8.9–12.7)
POTASSIUM SERPL-SCNC: 4.7 MMOL/L (ref 3.5–5.3)
PROT SERPL-MCNC: 7.5 G/DL (ref 6.4–8.4)
PSA SERPL-MCNC: 0.8 NG/ML (ref 0–4)
RBC # BLD AUTO: 4.72 MILLION/UL (ref 3.88–5.62)
SODIUM SERPL-SCNC: 140 MMOL/L (ref 135–147)
TRIGL SERPL-MCNC: 84 MG/DL
WBC # BLD AUTO: 6.12 THOUSAND/UL (ref 4.31–10.16)

## 2023-01-23 LAB
GAMMA INTERFERON BACKGROUND BLD IA-ACNC: 0.04 IU/ML
M TB IFN-G BLD-IMP: NEGATIVE
M TB IFN-G CD4+ BCKGRND COR BLD-ACNC: -0.01 IU/ML
M TB IFN-G CD4+ BCKGRND COR BLD-ACNC: -0.02 IU/ML
MITOGEN IGNF BCKGRD COR BLD-ACNC: >10 IU/ML

## 2023-03-03 ENCOUNTER — HOSPITAL ENCOUNTER (OUTPATIENT)
Facility: HOSPITAL | Age: 53
Setting detail: OBSERVATION
Discharge: HOME | End: 2023-03-05
Attending: EMERGENCY MEDICINE | Admitting: INTERNAL MEDICINE

## 2023-03-03 ENCOUNTER — APPOINTMENT (EMERGENCY)
Dept: VASCULAR ULTRASOUND | Facility: HOSPITAL | Age: 53
End: 2023-03-03

## 2023-03-03 ENCOUNTER — APPOINTMENT (OUTPATIENT)
Dept: RADIOLOGY | Facility: HOSPITAL | Age: 53
End: 2023-03-03

## 2023-03-03 DIAGNOSIS — O22.30 DVT (DEEP VEIN THROMBOSIS) IN PREGNANCY: ICD-10-CM

## 2023-03-03 DIAGNOSIS — F10.10 ALCOHOL ABUSE: ICD-10-CM

## 2023-03-03 DIAGNOSIS — F17.200 SMOKER: ICD-10-CM

## 2023-03-03 DIAGNOSIS — I82.4Z1 LOWER LEG DVT (DEEP VENOUS THROMBOEMBOLISM), ACUTE, RIGHT (HCC): ICD-10-CM

## 2023-03-03 DIAGNOSIS — E83.119 HEMOCHROMATOSIS: ICD-10-CM

## 2023-03-03 DIAGNOSIS — I82.411 ACUTE DEEP VEIN THROMBOSIS (DVT) OF FEMORAL VEIN OF RIGHT LOWER EXTREMITY (HCC): Primary | ICD-10-CM

## 2023-03-03 PROBLEM — Z72.0 TOBACCO ABUSE: Status: ACTIVE | Noted: 2023-03-03

## 2023-03-03 PROBLEM — L40.0 PLAQUE PSORIASIS: Status: ACTIVE | Noted: 2023-03-03

## 2023-03-03 LAB
ALBUMIN SERPL BCP-MCNC: 3.9 G/DL (ref 3.5–5)
ALP SERPL-CCNC: 76 U/L (ref 34–104)
ALT SERPL W P-5'-P-CCNC: 12 U/L (ref 7–52)
ANION GAP SERPL CALCULATED.3IONS-SCNC: 5 MMOL/L (ref 4–13)
APTT PPP: 76 SECONDS (ref 23–37)
AST SERPL W P-5'-P-CCNC: 13 U/L (ref 13–39)
BASOPHILS # BLD AUTO: 0.03 THOUSANDS/ÂΜL (ref 0–0.1)
BASOPHILS NFR BLD AUTO: 0 % (ref 0–1)
BILIRUB SERPL-MCNC: 1.22 MG/DL (ref 0.2–1)
BUN SERPL-MCNC: 8 MG/DL (ref 5–25)
CALCIUM SERPL-MCNC: 9.3 MG/DL (ref 8.4–10.2)
CHLORIDE SERPL-SCNC: 101 MMOL/L (ref 96–108)
CO2 SERPL-SCNC: 28 MMOL/L (ref 21–32)
CREAT SERPL-MCNC: 0.9 MG/DL (ref 0.6–1.3)
DEPRECATED AT III PPP: 84 % OF NORMAL (ref 92–136)
EOSINOPHIL # BLD AUTO: 0.18 THOUSAND/ÂΜL (ref 0–0.61)
EOSINOPHIL NFR BLD AUTO: 2 % (ref 0–6)
ERYTHROCYTE [DISTWIDTH] IN BLOOD BY AUTOMATED COUNT: 13.2 % (ref 11.6–15.1)
GFR SERPL CREATININE-BSD FRML MDRD: 98 ML/MIN/1.73SQ M
GLUCOSE SERPL-MCNC: 214 MG/DL (ref 65–140)
HCT VFR BLD AUTO: 47.2 % (ref 36.5–49.3)
HGB BLD-MCNC: 16 G/DL (ref 12–17)
IMM GRANULOCYTES # BLD AUTO: 0.05 THOUSAND/UL (ref 0–0.2)
IMM GRANULOCYTES NFR BLD AUTO: 1 % (ref 0–2)
INR PPP: 1 (ref 0.84–1.19)
LYMPHOCYTES # BLD AUTO: 1.9 THOUSANDS/ÂΜL (ref 0.6–4.47)
LYMPHOCYTES NFR BLD AUTO: 20 % (ref 14–44)
MCH RBC QN AUTO: 33.9 PG (ref 26.8–34.3)
MCHC RBC AUTO-ENTMCNC: 33.9 G/DL (ref 31.4–37.4)
MCV RBC AUTO: 100 FL (ref 82–98)
MONOCYTES # BLD AUTO: 0.78 THOUSAND/ÂΜL (ref 0.17–1.22)
MONOCYTES NFR BLD AUTO: 8 % (ref 4–12)
NEUTROPHILS # BLD AUTO: 6.58 THOUSANDS/ÂΜL (ref 1.85–7.62)
NEUTS SEG NFR BLD AUTO: 69 % (ref 43–75)
NRBC BLD AUTO-RTO: 0 /100 WBCS
PLATELET # BLD AUTO: 129 THOUSANDS/UL (ref 149–390)
PMV BLD AUTO: 9.9 FL (ref 8.9–12.7)
POTASSIUM SERPL-SCNC: 4.3 MMOL/L (ref 3.5–5.3)
PROT SERPL-MCNC: 7.5 G/DL (ref 6.4–8.4)
PROTHROMBIN TIME: 13.5 SECONDS (ref 11.6–14.5)
RBC # BLD AUTO: 4.72 MILLION/UL (ref 3.88–5.62)
SODIUM SERPL-SCNC: 134 MMOL/L (ref 135–147)
WBC # BLD AUTO: 9.52 THOUSAND/UL (ref 4.31–10.16)

## 2023-03-03 RX ORDER — HEPARIN SODIUM 1000 [USP'U]/ML
4000 INJECTION, SOLUTION INTRAVENOUS; SUBCUTANEOUS EVERY 6 HOURS PRN
Status: DISCONTINUED | OUTPATIENT
Start: 2023-03-03 | End: 2023-03-05

## 2023-03-03 RX ORDER — ACETAMINOPHEN 325 MG/1
650 TABLET ORAL EVERY 4 HOURS PRN
Status: DISCONTINUED | OUTPATIENT
Start: 2023-03-03 | End: 2023-03-05 | Stop reason: HOSPADM

## 2023-03-03 RX ORDER — NICOTINE 21 MG/24HR
1 PATCH, TRANSDERMAL 24 HOURS TRANSDERMAL DAILY
Status: DISCONTINUED | OUTPATIENT
Start: 2023-03-03 | End: 2023-03-03

## 2023-03-03 RX ORDER — FOLIC ACID 1 MG/1
1 TABLET ORAL DAILY
Status: DISCONTINUED | OUTPATIENT
Start: 2023-03-03 | End: 2023-03-05 | Stop reason: HOSPADM

## 2023-03-03 RX ORDER — HEPARIN SODIUM 10000 [USP'U]/100ML
3-30 INJECTION, SOLUTION INTRAVENOUS
Status: DISCONTINUED | OUTPATIENT
Start: 2023-03-03 | End: 2023-03-05

## 2023-03-03 RX ORDER — NICOTINE 21 MG/24HR
2 PATCH, TRANSDERMAL 24 HOURS TRANSDERMAL DAILY
Status: DISCONTINUED | OUTPATIENT
Start: 2023-03-03 | End: 2023-03-05 | Stop reason: HOSPADM

## 2023-03-03 RX ORDER — NALTREXONE HYDROCHLORIDE 50 MG/1
50 TABLET, FILM COATED ORAL DAILY
Status: DISCONTINUED | OUTPATIENT
Start: 2023-03-03 | End: 2023-03-05 | Stop reason: HOSPADM

## 2023-03-03 RX ORDER — HEPARIN SODIUM 1000 [USP'U]/ML
8000 INJECTION, SOLUTION INTRAVENOUS; SUBCUTANEOUS EVERY 6 HOURS PRN
Status: DISCONTINUED | OUTPATIENT
Start: 2023-03-03 | End: 2023-03-05

## 2023-03-03 RX ORDER — KETOROLAC TROMETHAMINE 30 MG/ML
15 INJECTION, SOLUTION INTRAMUSCULAR; INTRAVENOUS ONCE
Status: COMPLETED | OUTPATIENT
Start: 2023-03-03 | End: 2023-03-03

## 2023-03-03 RX ORDER — LANOLIN ALCOHOL/MO/W.PET/CERES
100 CREAM (GRAM) TOPICAL DAILY
Status: DISCONTINUED | OUTPATIENT
Start: 2023-03-03 | End: 2023-03-05 | Stop reason: HOSPADM

## 2023-03-03 RX ORDER — HEPARIN SODIUM 1000 [USP'U]/ML
8000 INJECTION, SOLUTION INTRAVENOUS; SUBCUTANEOUS ONCE
Status: COMPLETED | OUTPATIENT
Start: 2023-03-03 | End: 2023-03-03

## 2023-03-03 RX ADMIN — HEPARIN SODIUM 18 UNITS/KG/HR: 10000 INJECTION, SOLUTION INTRAVENOUS at 13:11

## 2023-03-03 RX ADMIN — MULTIPLE VITAMINS W/ MINERALS TAB 1 TABLET: TAB ORAL at 18:56

## 2023-03-03 RX ADMIN — NALTREXONE HYDROCHLORIDE 50 MG: 50 TABLET, FILM COATED ORAL at 19:34

## 2023-03-03 RX ADMIN — NICOTINE POLACRILEX 2 MG: 2 GUM, CHEWING BUCCAL at 20:34

## 2023-03-03 RX ADMIN — NICOTINE 2 PATCH: 21 PATCH, EXTENDED RELEASE TRANSDERMAL at 16:14

## 2023-03-03 RX ADMIN — KETOROLAC TROMETHAMINE 15 MG: 30 INJECTION, SOLUTION INTRAMUSCULAR at 08:55

## 2023-03-03 RX ADMIN — HEPARIN SODIUM 8000 UNITS: 1000 INJECTION INTRAVENOUS; SUBCUTANEOUS at 13:09

## 2023-03-03 RX ADMIN — Medication 100 MG: at 18:56

## 2023-03-03 RX ADMIN — FOLIC ACID 1 MG: 1 TABLET ORAL at 18:52

## 2023-03-03 NOTE — ASSESSMENT & PLAN NOTE
Patient has a history of heavy alcohol use and is on naltrexone 50 mg daily  Reports daily alcohol use  Maintain on CIWA protocol  Continue with thiamine and folate and multivitamin daily

## 2023-03-03 NOTE — CONSULTS
Consultation -Vascular Surgery  Reta Guardado 47 y o  male MRN: [id number]  Unit/Bed#: ED-28 Encounter: [id number]      ASSESSMENT:  48 yo M with acute RLE DVT    LEVDs: acute occlusive DVT in distal femoral, popliteal, posterior tibial, anterior tibial, peroneal, and gastrocnemius veins with free floating non occlusive DVT in mid femoral vein      Plan:  - no acute vascular intervention warranted at this time  - agree with hemeonc c/s given hemachromatosis, increased risk for factor V  - f/u thrombosis panel  - on hep gtt  PO AC on d/c    Rest of care per primary  Will sign off at this time  Please call with any changes to neurovascular exam, worsening pain/edema      Reason for Consult / Principal Problem:    HPI: Reta Guardado is a 47y o  year old male PMH hemachromatosis who presents with RLE pain, swelling  Pt states pain in calf began 2 days ago with swelling starting yesterday  Denies any numbness, tingling  Denies cool feeling extremity  No prior DVT in the past  Denies any family hx of bleeding disorders  Able to ambulate  Review of Systems   Constitutional: Negative for chills and fever  Respiratory: Negative for shortness of breath  Cardiovascular: Positive for leg swelling  Negative for chest pain  Genitourinary: Negative  Skin: Negative for color change  All other systems reviewed and are negative        Historical Information   Past Medical History:   Diagnosis Date   • Ear problems    • HL (hearing loss)    • Psoriasis      Past Surgical History:   Procedure Laterality Date   • CHOLECYSTECTOMY     • GASTRIC BYPASS       Social History   Social History     Substance and Sexual Activity   Alcohol Use Yes   • Alcohol/week: 21 0 standard drinks   • Types: 21 Cans of beer per week    Comment: average 3 a  day     Social History     Substance and Sexual Activity   Drug Use No     Social History     Tobacco Use   Smoking Status Every Day   • Packs/day: 1 00   • Types: Cigarettes   Smokeless Tobacco Never     History reviewed  No pertinent family history  Meds/Allergies     (Not in a hospital admission)    No current facility-administered medications for this encounter  Allergies   Allergen Reactions   • Codeine        Objective     Blood pressure 132/79, pulse 70, temperature 98 6 °F (37 °C), temperature source Oral, resp  rate 16, weight 102 kg (225 lb), SpO2 97 %  No intake or output data in the 24 hours ending 03/03/23 1159    PHYSICAL EXAM  Physical Exam  Vitals and nursing note reviewed  Constitutional:       General: He is not in acute distress  Appearance: Normal appearance  He is normal weight  He is not ill-appearing, toxic-appearing or diaphoretic  HENT:      Head: Normocephalic and atraumatic  Cardiovascular:      Rate and Rhythm: Normal rate  Pulmonary:      Effort: Pulmonary effort is normal  No respiratory distress  Musculoskeletal:         General: Swelling (RLE) present  Skin:     General: Skin is warm  Capillary Refill: Capillary refill takes less than 2 seconds  Neurological:      General: No focal deficit present  Mental Status: He is alert and oriented to person, place, and time  Psychiatric:         Mood and Affect: Mood normal          Behavior: Behavior normal      RLE with edema, extremity pink and warm  2+ DP/PT/pop      Lab Results:   I have personally reviewed pertinent lab results    , CBC:   Lab Results   Component Value Date    WBC 9 52 03/03/2023    HGB 16 0 03/03/2023    HCT 47 2 03/03/2023     (H) 03/03/2023     (L) 03/03/2023    MCH 33 9 03/03/2023    MCHC 33 9 03/03/2023    RDW 13 2 03/03/2023    MPV 9 9 03/03/2023    NRBC 0 03/03/2023   , CMP:   Lab Results   Component Value Date    SODIUM 134 (L) 03/03/2023    K 4 3 03/03/2023     03/03/2023    CO2 28 03/03/2023    BUN 8 03/03/2023    CREATININE 0 90 03/03/2023    CALCIUM 9 3 03/03/2023    AST 13 03/03/2023    ALT 12 03/03/2023    ALKPHOS 76 03/03/2023 EGFR 98 03/03/2023   , Coagulation:   Lab Results   Component Value Date    INR 1 00 03/03/2023   , Urinalysis: No results found for: Georjean Sprinkle, SPECGRAV, PHUR, LEUKOCYTESUR, NITRITE, PROTEINUA, GLUCOSEU, KETONESU, BILIRUBINUR, BLOODU  Imaging: I have personally reviewed pertinent reports  EKG, Pathology, and Other Studies: I have personally reviewed pertinent reports  Counseling / Coordination of Care  Total time spent today  30 minutes  Greater than 50% of total time was spent with the patient and / or family counseling and / or coordination of care           Sherine De León PA-C  3/3/2023 11:59 AM

## 2023-03-03 NOTE — CONSULTS
Medical Oncology/Hematology Consult Note  Nancy Wells, male, 47 y  o , 1970,  W /W -01, [id number]     Assessment and Plan  1  Acute occlusive distal DVT, provoked  - Patient complained of acute onset right lower extremity pain which started two days ago however suddenly worsened yesterday  - History of recent fall causing ankle sprain in November, patient saw orthopedics and was kept in immobilized in a cast post that  - Currently physically active, no recent surgeries, no recent travels, drives 1 5 hours to work in a day, no past h/o clots, had a covid infection 2 years ago, is a smoker, 1 ppd x 20 years  - Is unsure if he has been screened for lung cancer with low dose CT Scan, has not had a colonscopy   - On arrival a venous duplex of bilateral lower extremities was done which showed evidence of acute occlusive deep vein thrombosis noted in the distal femoal,  popliteal, posterior tibial, anterior tibial, peroneal, and gastrocnemius veins  There is free-floating, non-occlusive acute deep vein thrombosis noted in the  mid femoral vein  - Vascular surgery was consulted, no acute surgical intervention planned  - Given the free-floating clot in the lower extremities, there is concern for possible propagation of the clot that could result in a PE    - Would recommend placing an IVC filter, we have consulted IR for the same   - For anticoagulation, patient can be transitioned to DOACs and continue treatment for 3-6 months  - Recommend follow up at hematology oncology office in 3 months for repeating the workup for thrombotic state outpatient    2  Hemachromatosis   - Patient states he was diagnosed in 2008  - He has been phlebotomized once in his lifetime  - He does not follow a hematologist for his hemochromatosis  - Hb 16 currently  - Would need to follow up outpatient with hematology oncology for the same    Outpatient follow up plan:  We will schedule an appointment outpatient for the patient to follow up with hematology oncology  Communication with patient/family:  Patient was updated at [facility] with attending  Communication with team:  Primary team to be updated with recommendations  Case was discussed with hematology/oncology attending, Dr Brian Shay  Reason for consultation: Acute occlusive DVT     History of present illness:  George Vega is a 47 y o  male with a PMHx of hemachromatosis, psoriasis (recently started on a new agent, Risankizumab 3 weeks ago), alcohol use and tobacco use who presented to the hospital today with complaints of acute worsening of pain in his right lower extremity  The pain started 2 days ago and acutely worsened yesterday  It was relieved after patient received medication for pain after coming to the hospital  He does not complain of any associated numbness or tingling, no change in color of the extremity  His pain has currently subsided  Patient had a fall back in November 2022 when he had an ankle sprain and was immobilized with a cast  No recent travel, no recent surgery, no previous history of clots or cancer  Patient does not have family history of clotting disorders  He drives 1 5 hrs to work daily  He is a smoker since 20 years and smokes 1 pack per day  He is unsure whether he has gotten a screening CT scan of his lungs for lung cancer screening  He has not had a colonoscopy or PSA for screening  He had a covid infection 2 years ago  On admission, patient got a venous duplex ultrasound done which revealed an acute occlusive deep vein thrombosis noted in the distal femoral, popliteal, posterior tibial, anterior tibial, peroneal, and gastrocnemius veins with a free-floating, non-occlusive acute deep vein thrombosis noted in the  mid femoral vein  Patient is currently on the heparin drip  Vascular surgery was also consulted and no surgical intervention is planned currently           Review of Systems:   Review of Systems   Constitutional: Negative for activity change, appetite change, chills, fever and unexpected weight change  HENT: Negative for congestion, tinnitus and trouble swallowing  Eyes: Negative for photophobia, pain and visual disturbance  Respiratory: Negative for cough, chest tightness, shortness of breath and wheezing  Cardiovascular: Positive for leg swelling  Negative for chest pain and palpitations  Gastrointestinal: Negative for abdominal pain, nausea and vomiting  Genitourinary: Negative for difficulty urinating  Musculoskeletal: Positive for joint swelling  Negative for gait problem  Skin: Negative for color change and pallor  Neurological: Negative for weakness and numbness  Psychiatric/Behavioral: Negative for agitation and confusion  Oncology History:   Cancer Staging   No matching staging information was found for the patient  Oncology History    No history exists  Past Medical History:   Past Medical History:   Diagnosis Date   • Ear problems    • HL (hearing loss)    • Psoriasis        Past Surgical History:   Procedure Laterality Date   • CHOLECYSTECTOMY     • GASTRIC BYPASS         History reviewed  No pertinent family history  Social History     Socioeconomic History   • Marital status: /Civil Union     Spouse name: None   • Number of children: None   • Years of education: None   • Highest education level: None   Occupational History   • None   Tobacco Use   • Smoking status: Every Day     Packs/day: 1 00     Types: Cigarettes   • Smokeless tobacco: Never   Vaping Use   • Vaping Use: Never used   Substance and Sexual Activity   • Alcohol use:  Yes     Alcohol/week: 21 0 standard drinks     Types: 21 Cans of beer per week     Comment: average 3 a  day   • Drug use: No   • Sexual activity: None   Other Topics Concern   • None   Social History Narrative   • None     Social Determinants of Health     Financial Resource Strain: Not on file   Food Insecurity: Not on file   Transportation Needs: Not on file   Physical Activity: Not on file   Stress: Not on file   Social Connections: Not on file   Intimate Partner Violence: Not on file   Housing Stability: Not on file         Current Facility-Administered Medications:   •  acetaminophen (TYLENOL) tablet 650 mg, 650 mg, Oral, Q4H PRN, Marcus Kwok MD  •  folic acid (FOLVITE) tablet 1 mg, 1 mg, Oral, Daily, Marcus Kwok MD  •  heparin (porcine) 25,000 units in 0 45% NaCl 250 mL infusion (premix), 3-30 Units/kg/hr (Order-Specific), Intravenous, Titrated, Marcus Kwok MD, Last Rate: 18 mL/hr at 03/03/23 1311, 18 Units/kg/hr at 03/03/23 1311  •  heparin (porcine) injection 4,000 Units, 4,000 Units, Intravenous, Q6H PRN, Marcus Kwok MD  •  heparin (porcine) injection 8,000 Units, 8,000 Units, Intravenous, Q6H PRN, Marcus Kwok MD  •  multivitamin-minerals (CENTRUM) tablet 1 tablet, 1 tablet, Oral, Daily, Marcus Kwok MD  •  naltrexone (REVIA) tablet 50 mg, 50 mg, Oral, Daily, Marcus Kwok MD  •  nicotine (NICODERM CQ) 21 mg/24 hr TD 24 hr patch 2 patch, 2 patch, Transdermal, Daily, Marcus Kwok MD, 2 patch at 03/03/23 1614  •  thiamine tablet 100 mg, 100 mg, Oral, Daily, Marcus Kwok MD    Medications Prior to Admission   Medication   • adalimumab (HUMIRA) 40 mg/0 8 mL PSKT   • Apremilast 30 MG TABS   • betamethasone valerate (VALISONE) 0 1 % cream   • Empagliflozin-metFORMIN HCl ER (Synjardy XR) 5-1000 MG TB24   • erythromycin (ILOTYCIN) ophthalmic ointment       Allergies   Allergen Reactions   • Codeine          Physical Exam:     BP (!) 134/101   Pulse 84   Temp 98 6 °F (37 °C)   Resp 17   Wt 102 kg (225 lb)   SpO2 98%   BMI 27 39 kg/m²     Physical Exam  Constitutional:       General: He is not in acute distress  Appearance: Normal appearance  He is normal weight  HENT:      Head: Normocephalic and atraumatic  Eyes:      Extraocular Movements: Extraocular movements intact        Pupils: Pupils are equal, round, and reactive to light  Cardiovascular:      Rate and Rhythm: Normal rate and regular rhythm  Pulses: Normal pulses  Heart sounds: Normal heart sounds  Pulmonary:      Effort: Pulmonary effort is normal       Breath sounds: Normal breath sounds  No wheezing or rhonchi  Abdominal:      General: Abdomen is flat  There is no distension  Palpations: Abdomen is soft  Tenderness: There is no abdominal tenderness  Musculoskeletal:         General: Swelling and tenderness present  Right lower leg: Edema present  Comments: Pedal pulses in right lower extremity intact   Skin:     General: Skin is warm  Coloration: Skin is not pale  Findings: No erythema  Neurological:      General: No focal deficit present  Mental Status: He is alert and oriented to person, place, and time  Sensory: No sensory deficit           Recent Results (from the past 48 hour(s))   CBC and differential    Collection Time: 03/03/23  8:50 AM   Result Value Ref Range    WBC 9 52 4 31 - 10 16 Thousand/uL    RBC 4 72 3 88 - 5 62 Million/uL    Hemoglobin 16 0 12 0 - 17 0 g/dL    Hematocrit 47 2 36 5 - 49 3 %     (H) 82 - 98 fL    MCH 33 9 26 8 - 34 3 pg    MCHC 33 9 31 4 - 37 4 g/dL    RDW 13 2 11 6 - 15 1 %    MPV 9 9 8 9 - 12 7 fL    Platelets 850 (L) 880 - 390 Thousands/uL    nRBC 0 /100 WBCs    Neutrophils Relative 69 43 - 75 %    Immat GRANS % 1 0 - 2 %    Lymphocytes Relative 20 14 - 44 %    Monocytes Relative 8 4 - 12 %    Eosinophils Relative 2 0 - 6 %    Basophils Relative 0 0 - 1 %    Neutrophils Absolute 6 58 1 85 - 7 62 Thousands/µL    Immature Grans Absolute 0 05 0 00 - 0 20 Thousand/uL    Lymphocytes Absolute 1 90 0 60 - 4 47 Thousands/µL    Monocytes Absolute 0 78 0 17 - 1 22 Thousand/µL    Eosinophils Absolute 0 18 0 00 - 0 61 Thousand/µL    Basophils Absolute 0 03 0 00 - 0 10 Thousands/µL   Comprehensive metabolic panel    Collection Time: 03/03/23  8:50 AM   Result Value Ref Range Sodium 134 (L) 135 - 147 mmol/L    Potassium 4 3 3 5 - 5 3 mmol/L    Chloride 101 96 - 108 mmol/L    CO2 28 21 - 32 mmol/L    ANION GAP 5 4 - 13 mmol/L    BUN 8 5 - 25 mg/dL    Creatinine 0 90 0 60 - 1 30 mg/dL    Glucose 214 (H) 65 - 140 mg/dL    Calcium 9 3 8 4 - 10 2 mg/dL    AST 13 13 - 39 U/L    ALT 12 7 - 52 U/L    Alkaline Phosphatase 76 34 - 104 U/L    Total Protein 7 5 6 4 - 8 4 g/dL    Albumin 3 9 3 5 - 5 0 g/dL    Total Bilirubin 1 22 (H) 0 20 - 1 00 mg/dL    eGFR 98 ml/min/1 73sq m   Protime-INR    Collection Time: 03/03/23 10:29 AM   Result Value Ref Range    Protime 13 5 11 6 - 14 5 seconds    INR 1 00 0 84 - 1 19       XR chest 1 view portable    Result Date: 3/3/2023  Narrative: CHEST INDICATION:   dvt, smoker, r/o mass  COMPARISON:  8/15/2017 EXAM PERFORMED/VIEWS:  XR CHEST PORTABLE FINDINGS: Cardiomediastinal silhouette appears unremarkable  The lungs are clear  No pneumothorax or pleural effusion  Osseous structures appear within normal limits for patient age  Impression: No acute cardiopulmonary disease  Workstation performed: [id number]     VAS lower limb venous duplex study, unilateral/limited    Result Date: 3/3/2023  Narrative:  [facility] REPORT [facility]INICAL: Indications: Patient presents with sudden onset of right lower extremity edema after recent injury  Operative History: Patient denies any cardiovascular procedures Risk Factors The patient has history of smoking (current) 1-2 ppd    FINDINGS:  Right          Impression              FV Mid         Non Occlusive Thrombus  FV Dist        Occlusive Subsegmental  Popliteal      Occlusive Subsegmental  PostTibial     Occlusive Subsegmental  Peroneal       Occlusive Subsegmental  AntTibial      Occlusive Subsegmental  Gastrocnemius  Occlusive Subsegmental     CONCLUSION: RIGHT LOWER LIMB Evidence of acute occlusive deep vein thrombosis noted in the distal femoal, popliteal, posterior tibial, anterior tibial, peroneal, and gastrocnemius veins  There is free-floating, non-occlusive acute deep vein thrombosis noted in the mid femoral vein  No evidence of superficial thrombophlebitis noted  Popliteal, posterior tibial and anterior tibial arterial Doppler waveforms are triphasic  LEFT LOWER LIMB LIMITED Evaluation shows no evidence of thrombus in the common femoral, femoral, deep femoral, and popliteal veins  Doppler evaluation shows a normal response to augmentation maneuvers  Technical findings were given to Ruby Rosario PA-C via [employer] text  Labs and pertinent reports reviewed

## 2023-03-03 NOTE — H&P
[facility]  H&P- Lucy Kim 1970, 47 y o  male MRN: [id number]  Unit/Bed#: W -01 Encounter: [id number]  Primary Care Provider: Maxime Gonzales MD   Date and time admitted to hospital: 3/3/2023  7:49 AM    * Lower leg DVT (deep venous thromboembolism), acute, right [facility])  Assessment & Plan  Patient presents with 1 day history of right lower extremity pain and swelling  Ultrasound of the lower right lower extremity shows,"acute occlusive deep vein thrombosis noted in the distal femoal,  popliteal, posterior tibial, anterior tibial, peroneal, and gastrocnemius veins  There is free-floating, non-occlusive acute deep vein thrombosis noted in the mid femoral vein "  Patient denies any recent surgery, prolonged car/air travel, immobility however did have an ankle sprain in January but states that he has remained physically active  He does have history of plaque psoriasis recently started on Skyrizi and history of hemochromatosis for which he does not follow-up with hematology  Seen by vascular surgery because of free-floating mobile clot  No vascular surgical intervention recommended  Because of extensive DVT, will admit for heparin GTT  Hematology consultation will be obtained  Likely should be able to transition to NOACs in the next 24 hours  Thrombosis panels ordered in the emergency room  Tobacco abuse  Assessment & Plan  Reports smoking 3 pack/day  Continue with nicotine replacement therapy  Alcohol abuse  Assessment & Plan  Patient has a history of heavy alcohol use and is on naltrexone 50 mg daily  Reports daily alcohol use  Maintain on CIWA protocol  Continue with thiamine and folate and multivitamin daily  Plaque psoriasis  Assessment & Plan  Patient maintained on [address]      Acute deep vein thrombosis (DVT) of femoral vein of right lower extremity (HCC)-resolved as of 3/3/2023  Assessment & Plan  Presented with 1 day history of right lower extremity pain and swelling  VTE Pharmacologic Prophylaxis: VTE Score: 8 High Risk (Score >/= 5) - Pharmacological DVT Prophylaxis Ordered: heparin drip  Sequential Compression Devices Ordered  Code Status: Level 1 - Full Code   Discussion with family: Patient declined call to   Anticipated Length of Stay: Patient will be admitted on an observation basis with an anticipated length of stay of less than 2 midnights secondary to Management of extensive lower extremity DVT  Renita Victor Total Time Spent on Date of Encounter in care of patient: 75 minutes This time was spent on one or more of the following: performing physical exam; counseling and coordination of care; obtaining or reviewing history; documenting in the medical record; reviewing/ordering tests, medications or procedures; communicating with other healthcare professionals and discussing with patient's family/caregivers  Chief Complaint: Right leg pain    History of Present Illness:  Quirino Wilde is a 47 y o  male with a PMH of alcohol abuse, tobacco abuse, plaque psoriasis, hemochromatosis who presents with 1 day history of right leg swelling and pain  Denies any trauma but did fell approximately 5 feet off of a tree stump back in January and sustained an ankle sprain  Patient stated that he remained physically active and was not immobilized post injury  He denies any prior history of blood clots  He does have a history of hemochromatosis but he is not on any treatment  Does have history of plaque psoriasis for which he was recently started on Skyrizi  Denies any prolonged car or air travel and does not endorse any history of malignancy  Denies any chest pain, palpitations, shortness of breath  Denies any syncopal event  Review of Systems:  Review of Systems   Constitutional: Positive for activity change  HENT: Negative  Eyes: Negative  Respiratory: Negative  Cardiovascular: Positive for leg swelling  Gastrointestinal: Negative  Endocrine: Negative  Genitourinary: Negative  Musculoskeletal: Positive for arthralgias and gait problem  Skin: Negative  Allergic/Immunologic: Negative  Psychiatric/Behavioral: Negative  Past Medical and Surgical History:   Past Medical History:   Diagnosis Date   • Ear problems    • HL (hearing loss)    • Psoriasis        Past Surgical History:   Procedure Laterality Date   • CHOLECYSTECTOMY     • GASTRIC BYPASS         Meds/Allergies:  Prior to Admission medications    Medication Sig Start Date End Date Taking? Authorizing Provider   adalimumab (HUMIRA) 40 mg/0 8 mL PSKT Inject 1 Dose under the skin every 14 (fourteen) days 2/23/19   Historical Provider, MD   Apremilast 30 MG TABS Take 30 mg by mouth 2 (two) times a day  Patient not taking: Reported on 6/12/2022    Historical Provider, MD   betamethasone valerate (VALISONE) 0 1 % cream Apply topically 2 (two) times a day  Patient not taking: Reported on 6/12/2022 9/24/19   Romi Moe MD   Empagliflozin-metFORMIN HCl ER (Synjardy XR) 5-1000 MG TB24 Synjardy XR 5 mg-1,000 mg tablet, extended release    Historical Provider, MD   erythromycin (ILOTYCIN) ophthalmic ointment Place a 1/2 inch ribbon of ointment into the lower eyelid 4 times daily for 5 days 8/6/22   William Rodney PA-C     I have reviewed home medications with patient personally  Allergies: Allergies   Allergen Reactions   • Codeine        Social History:  Marital Status: /Civil Union   OSubstance Use History:   Social History     Substance and Sexual Activity   Alcohol Use Yes   • Alcohol/week: 21 0 standard drinks   • Types: 21 Cans of beer per week    Comment: average 3 a  day     Social History     Tobacco Use   Smoking Status Every Day   • Packs/day: 1 00   • Types: Cigarettes   Smokeless Tobacco Never     Social History     Substance and Sexual Activity   Drug Use No       Family History:  History reviewed  No pertinent family history      Physical Exam:     Vitals:   Blood Pressure: (!) 134/101 (03/03/23 1428)  Pulse: 84 (03/03/23 1428)  Temperature: 98 6 °F (37 °C) (03/03/23 1428)  Temp Source: Oral (03/03/23 1112)  Respirations: 17 (03/03/23 1428)  Weight - Scale: 102 kg (225 lb) (03/03/23 0755)  SpO2: 98 % (03/03/23 1428)    Physical Exam  Constitutional:       General: He is not in acute distress  HENT:      Nose: Nose normal       Mouth/Throat:      Mouth: Mucous membranes are moist    Eyes:      Extraocular Movements: Extraocular movements intact  Pupils: Pupils are equal, round, and reactive to light  Cardiovascular:      Rate and Rhythm: Normal rate and regular rhythm  Pulses: Normal pulses  Pulmonary:      Effort: Pulmonary effort is normal    Abdominal:      General: Abdomen is flat  Bowel sounds are normal       Palpations: Abdomen is soft  Musculoskeletal:      Cervical back: Neck supple  Comments: Extensive erythema and swelling noted of the right lower extremity including the calf and proximal thigh  Positive calf tenderness and Homans' sign  Neurological:      General: No focal deficit present  Mental Status: He is alert and oriented to person, place, and time  Mental status is at baseline     Psychiatric:         Mood and Affect: Mood normal          Additional Data:     Lab Results:  Results from last 7 days   Lab Units 03/03/23  0850   WBC Thousand/uL 9 52   HEMOGLOBIN g/dL 16 0   HEMATOCRIT % 47 2   PLATELETS Thousands/uL 129*   NEUTROS PCT % 69   LYMPHS PCT % 20   MONOS PCT % 8   EOS PCT % 2     Results from last 7 days   Lab Units 03/03/23  0850   SODIUM mmol/L 134*   POTASSIUM mmol/L 4 3   CHLORIDE mmol/L 101   CO2 mmol/L 28   BUN mg/dL 8   CREATININE mg/dL 0 90   ANION GAP mmol/L 5   CALCIUM mg/dL 9 3   ALBUMIN g/dL 3 9   TOTAL BILIRUBIN mg/dL 1 22*   ALK PHOS U/L 76   ALT U/L 12   AST U/L 13   GLUCOSE RANDOM mg/dL 214*     Results from last 7 days   Lab Units 03/03/23  1029   INR  1 00 Lines/Drains:  Invasive Devices     Peripheral Intravenous Line  Duration           Peripheral IV 03/03/23 Right Antecubital <1 day                    Imaging: Reviewed radiology reports from this admission including: ultrasound(s)  XR chest 1 view portable   Final Result by Pranav Lopez MD (03/03 7189)      No acute cardiopulmonary disease  Workstation performed: [id number]         VAS lower limb venous duplex study, unilateral/limited    (Results Pending)       EKG and Other Studies Reviewed on Admission:   · EKG: NSR  HR 90     ** Please Note: This note has been constructed using a voice recognition system   **

## 2023-03-03 NOTE — ASSESSMENT & PLAN NOTE
Patient presents with 1 day history of right lower extremity pain and swelling  Ultrasound of the lower right lower extremity shows,"acute occlusive deep vein thrombosis noted in the distal femoal,  popliteal, posterior tibial, anterior tibial, peroneal, and gastrocnemius veins  There is free-floating, non-occlusive acute deep vein thrombosis noted in the mid femoral vein "  Patient denies any recent surgery, prolonged car/air travel, immobility however did have an ankle sprain in January but states that he has remained physically active  He does have history of plaque psoriasis recently started on Skyrizi and history of hemochromatosis for which he does not follow-up with hematology  Seen by vascular surgery because of free-floating mobile clot  No vascular surgical intervention recommended  Because of extensive DVT, will admit for heparin GTT  Hematology consultation will be obtained  Likely should be able to transition to NOACs in the next 24 hours  Thrombosis panels ordered in the emergency room

## 2023-03-03 NOTE — ED NOTES
Patient declined offer of menu to order food, states spouse is bringing food  Patient inquired about "alarm" on IV pump bc he states he plans to go outside later to have a smoke        Alonso Esparza RN  03/03/23 5098

## 2023-03-03 NOTE — ED PROVIDER NOTES
History  Chief Complaint   Patient presents with   • Leg Pain     Jumped over a trench 2  days ago increase pain in right leg since then      This 60-year-old male presents to the emergency room with a history of alcoholism on naltrexone  He still drinks 2 drinks per day  He jumped over a trench 2 days ago and had a sudden onset of pain in his right leg  He has noticed over the past 24 hours increasing pain with swelling  He complains of pain with ambulation and is relieved with rest   He states that the pain is described as a crampy ache sensation  It is located over the posterior medial aspect of his calf  He has a history of spraining his ankle in October  He has had edema of his ankles since that time  He states that his ankle still bothers him from time to time but it is getting better  He denies any fever or chills  He denies any nausea or vomiting  He denies any diarrhea  He denies any low back pain  He denies any numbness, tingling, weakness in his right leg  His past medical history is positive for alcoholism, hearing loss, psoriasis  Past surgeries include cholecystectomy and gastric bypass surgery  His differential diagnosis includes but is not limited to DVT, calf strain, lumbar radiculitis/radiculopathy, fracture        History provided by:  Patient  Leg Pain  Location:  Leg  Injury: yes    Leg location:  R leg  Pain details:     Quality:  Aching    Severity:  Moderate    Onset quality:  Sudden    Duration:  2 days    Timing:  Constant    Progression:  Waxing and waning  Chronicity:  New  Prior injury to area:  Yes (right ankle pain with swelling over the lateral malleolus)  Relieved by:  Rest  Worsened by:  Bearing weight  Ineffective treatments:  None tried  Associated symptoms: swelling    Associated symptoms: no back pain, no decreased ROM, no fever, no muscle weakness, no numbness, no stiffness and no tingling    Risk factors: no concern for non-accidental trauma, no frequent fractures, no obesity and no recent illness        Prior to Admission Medications   Prescriptions Last Dose Informant Patient Reported? Taking? Apremilast 30 MG TABS   Yes No   Sig: Take 30 mg by mouth 2 (two) times a day   Patient not taking: Reported on 6/12/2022   Empagliflozin-metFORMIN HCl ER (Synjardy XR) 5-1000 MG TB24   Yes No   Sig: Synjardy XR 5 mg-1,000 mg tablet, extended release   adalimumab (HUMIRA) 40 mg/0 8 mL PSKT   Yes No   Sig: Inject 1 Dose under the skin every 14 (fourteen) days   betamethasone valerate (VALISONE) 0 1 % cream   No No   Sig: Apply topically 2 (two) times a day   Patient not taking: Reported on 6/12/2022   erythromycin (ILOTYCIN) ophthalmic ointment   No No   Sig: Place a 1/2 inch ribbon of ointment into the lower eyelid 4 times daily for 5 days      Facility-Administered Medications: None       Past Medical History:   Diagnosis Date   • Ear problems    • HL (hearing loss)    • Psoriasis        Past Surgical History:   Procedure Laterality Date   • CHOLECYSTECTOMY     • GASTRIC BYPASS         History reviewed  No pertinent family history  I have reviewed and agree with the history as documented  E-Cigarette/Vaping   • E-Cigarette Use Never User      E-Cigarette/Vaping Substances     Social History     Tobacco Use   • Smoking status: Every Day     Packs/day: 1 00     Types: Cigarettes   • Smokeless tobacco: Never   Vaping Use   • Vaping Use: Never used   Substance Use Topics   • Alcohol use: Yes     Alcohol/week: 21 0 standard drinks     Types: 21 Cans of beer per week     Comment: average 3 a  day   • Drug use: No       Review of Systems   Constitutional: Positive for activity change  Negative for appetite change, chills and fever  Respiratory: Negative for chest tightness  Cardiovascular: Positive for leg swelling  Negative for chest pain  Gastrointestinal: Negative for abdominal pain  Musculoskeletal: Positive for gait problem   Negative for back pain and stiffness  Skin: Negative for color change, pallor and rash  All other systems reviewed and are negative  Physical Exam  Physical Exam  Vitals and nursing note reviewed  Constitutional:       General: He is not in acute distress  Appearance: Normal appearance  He is not ill-appearing, toxic-appearing or diaphoretic  HENT:      Head: Normocephalic  Right Ear: External ear normal       Left Ear: External ear normal    Eyes:      General:         Right eye: No discharge  Left eye: No discharge  Conjunctiva/sclera: Conjunctivae normal    Cardiovascular:      Rate and Rhythm: Normal rate and regular rhythm  Pulmonary:      Effort: Pulmonary effort is normal       Breath sounds: Normal breath sounds  Musculoskeletal:         General: Swelling and tenderness present  Comments: Right calf, negative holmans   Skin:     General: Skin is warm  Capillary Refill: Capillary refill takes less than 2 seconds  Neurological:      General: No focal deficit present  Mental Status: He is alert and oriented to person, place, and time  Psychiatric:         Mood and Affect: Mood normal          Behavior: Behavior normal          Thought Content:  Thought content normal          Judgment: Judgment normal          Vital Signs  ED Triage Vitals [03/03/23 0755]   Temperature Pulse Respirations Blood Pressure SpO2   98 1 °F (36 7 °C) 85 18 142/84 98 %      Temp Source Heart Rate Source Patient Position - Orthostatic VS BP Location FiO2 (%)   Oral Monitor Sitting Right arm --      Pain Score       8           Vitals:    03/03/23 0755 03/03/23 1112 03/03/23 1428   BP: 142/84 132/79 (!) 134/101   Pulse: 85 70 84   Patient Position - Orthostatic VS: Sitting Lying          Visual Acuity      ED Medications  Medications   heparin (porcine) 25,000 units in 0 45% NaCl 250 mL infusion (premix) (18 Units/kg/hr × 100 kg (Order-Specific) Intravenous New Bag 3/3/23 1311)   heparin (porcine) injection 8,000 Units (has no administration in time range)   heparin (porcine) injection 4,000 Units (has no administration in time range)   naltrexone (REVIA) tablet 50 mg (has no administration in time range)   thiamine tablet 100 mg (has no administration in time range)   folic acid (FOLVITE) tablet 1 mg (has no administration in time range)   multivitamin-minerals (CENTRUM) tablet 1 tablet (has no administration in time range)   acetaminophen (TYLENOL) tablet 650 mg (has no administration in time range)   nicotine (NICODERM CQ) 21 mg/24 hr TD 24 hr patch 2 patch (2 patches Transdermal Medication Applied 3/3/23 1614)   ketorolac (TORADOL) injection 15 mg (15 mg Intravenous Given 3/3/23 0855)   heparin (porcine) injection 8,000 Units (8,000 Units Intravenous Given 3/3/23 1309)       Diagnostic Studies  Results Reviewed     Procedure Component Value Units Date/Time    Antithrombin III Activity [[id number]]  (Abnormal) Collected: 03/03/23 1101    Lab Status: Final result Specimen: Blood from Arm, Right Updated: 03/03/23 1636     AntiThrombIN III Activity 84 % of Normal     Protein C activity [[id number]] Collected: 03/03/23 1101    Lab Status: In process Specimen: Blood from Arm, Right Updated: 03/03/23 1112    Protein S activity [[id number]] Collected: 03/03/23 1101    Lab Status: In process Specimen: Blood from Arm, Right Updated: 03/03/23 1112    Prothrombin gene mutation [[id number]] Collected: 03/03/23 1101    Lab Status: In process Specimen: Blood from Arm, Right Updated: 03/03/23 1111    Cardiolipin antibody [[id number]] Collected: 03/03/23 1101    Lab Status: In process Specimen: Blood from Arm, Right Updated: 03/03/23 1111    Factor 5 leiden [[id number]] Collected: 03/03/23 1101    Lab Status: In process Specimen: Blood from Arm, Right Updated: 03/03/23 1111    Protein S Antigen, Total & Free [[id number]] Collected: 03/03/23 1101    Lab Status:  In process Specimen: Blood from Arm, Right Updated: 03/03/23 1111    Beta-2 glycoprotein antibodies [[id number]] Collected: 03/03/23 1101    Lab Status: In process Specimen: Blood from Arm, Right Updated: 03/03/23 1111    Lupus anticoagulant [[id number]] Collected: 03/03/23 1101    Lab Status:  In process Specimen: Blood from Arm, Right Updated: 03/03/23 1110    Protime-INR [[id number]]  (Normal) Collected: 03/03/23 1029    Lab Status: Final result Specimen: Blood from Arm, Right Updated: 03/03/23 1053     Protime 13 5 seconds      INR 1 00    Comprehensive metabolic panel [[id number]]  (Abnormal) Collected: 03/03/23 0850    Lab Status: Final result Specimen: Blood from Arm, Right Updated: 03/03/23 0923     Sodium 134 mmol/L      Potassium 4 3 mmol/L      Chloride 101 mmol/L      CO2 28 mmol/L      ANION GAP 5 mmol/L      BUN 8 mg/dL      Creatinine 0 90 mg/dL      Glucose 214 mg/dL      Calcium 9 3 mg/dL      AST 13 U/L      ALT 12 U/L      Alkaline Phosphatase 76 U/L      Total Protein 7 5 g/dL      Albumin 3 9 g/dL      Total Bilirubin 1 22 mg/dL      eGFR 98 ml/min/1 73sq m     Narrative:      [place] guidelines for Chronic Kidney Disease (CKD):   •  Stage 1 with normal or high GFR (GFR > 90 mL/min/1 73 square meters)  •  Stage 2 Mild CKD (GFR = 60-89 mL/min/1 73 square meters)  •  Stage 3A Moderate CKD (GFR = 45-59 mL/min/1 73 square meters)  •  Stage 3B Moderate CKD (GFR = 30-44 mL/min/1 73 square meters)  •  Stage 4 Severe CKD (GFR = 15-29 mL/min/1 73 square meters)  •  Stage 5 End Stage CKD (GFR <15 mL/min/1 73 square meters)  Note: GFR calculation is accurate only with a steady state creatinine    CBC and differential [[id number]]  (Abnormal) Collected: 03/03/23 0850    Lab Status: Final result Specimen: Blood from Arm, Right Updated: 03/03/23 0902     WBC 9 52 Thousand/uL      RBC 4 72 Million/uL      Hemoglobin 16 0 g/dL      Hematocrit 47 2 %       fL      MCH 33 9 pg      MCHC 33 9 g/dL      RDW 13 2 %      MPV 9 9 fL      Platelets 698 Thousands/uL nRBC 0 /100 WBCs      Neutrophils Relative 69 %      Immat GRANS % 1 %      Lymphocytes Relative 20 %      Monocytes Relative 8 %      Eosinophils Relative 2 %      Basophils Relative 0 %      Neutrophils Absolute 6 58 Thousands/µL      Immature Grans Absolute 0 05 Thousand/uL      Lymphocytes Absolute 1 90 Thousands/µL      Monocytes Absolute 0 78 Thousand/µL      Eosinophils Absolute 0 18 Thousand/µL      Basophils Absolute 0 03 Thousands/µL                  XR chest 1 view portable   Final Result by Reyna Dunaway MD (03/03 1319)      No acute cardiopulmonary disease  Workstation performed: [id number]         VAS lower limb venous duplex study, unilateral/limited    (Results Pending)              Procedures  Procedures         ED Course  ED Course as of 03/03/23 1812   Fri Mar 03, 2023   1118 I reached out to vascular surgery  Ramos Saab  She is going to come and evaluate him for possible intervention as he is symptomatic with pain and swelling  1119 I spoke to the patient sonographer  Patient has an extensive acute occlusion DVT in the right lower extremity distal femoral, popliteal and calf veins  He also has free-floating thrombus in the mid femoral vein and it is 6 cm from the common femoral    1213 I spoke to Ramos Saab from vascular surgery  She evaluated the patient and does not think she needs a surgical intervention for this condition as he is not having extreme pain or swelling  Medical Decision Making  Patient presents to the emergency room after jumping over a trench  He then noticed a 1 day history of swelling and pain over his right lower leg  He states is worse with ambulation and relieved with rest   He denies any associated chest pain or shortness of breath  He has no history of having a DVT or PE  He has not had any recent travel by flight for greater than 2 hours    He has not recently had any surgical procedures  He is an active smoker  He has a history of having an ankle sprain/fracture in October 2022  He was seen and evaluated  He is well in appearance  His heart was regular rate and rhythm  His lungs were clear to auscultation  His abdomen was soft nondistended nontender without mass or hepatosplenomegaly  There was +2 pretibial edema on the right as compared to the left  He did have posterior medial calf tenderness  He had a negative Homans' sign  There are pulses that are +2 and symmetrical   He has no signs of venous stasis disease  Motor and sensation were intact to the deep and superficial peroneal's, posterior tibial and sural distribution of the right lower extremity  An ultrasound venous Doppler was performed  It was positive for a deep vein thrombosis that was acute occlusion in the mid femoral, popliteal and calf veins  It is within 6 cm of the femoral vein  He also had a free floating thrombus in the femoral vein  A consult was placed to vascular surgery  They do not feel that a thrombectomy was necessary at this time as the patient was comfortable in the room and only had pain with ambulation  Laboratory tests were taken and were reviewed  Patient had not normal coagulation tests  He had a normal platelets  A thrombus panel was sent to the lab and is pending  Patient was started on VTE heparin  He will be followed up with protimes  Impression is acute DVT right leg    Plan    Patient was admitted to this limb service  He will continue on the IV heparin  He will be changed over to oral anticoagulation  DVT (deep vein thrombosis) in pregnancy: acute illness or injury  Hemochromatosis: chronic illness or injury  Smoker: chronic illness or injury  Amount and/or Complexity of Data Reviewed  Labs: ordered  Details: Reviewed by me  Radiology: ordered       Details: Ultrasound venous Doppler results reviewed by me, interpreted by [facility] vascular  Risk  Prescription drug management  Decision regarding hospitalization  Disposition  Final diagnoses:   DVT (deep vein thrombosis) in pregnancy   Hemochromatosis   Smoker     Time reflects when diagnosis was documented in both MDM as applicable and the Disposition within this note     Time User Action Codes Description Comment    3/3/2023 11:10 AM Domingo Reel Add [O22 30] DVT (deep vein thrombosis) in pregnancy     3/3/2023 12:25 PM Domingo Reel Add [E83 119] Hemochromatosis     3/3/2023 12:25 PM Domingo Reel Add [F17 200] Smoker     3/3/2023  4:38 PM Deepika Meagan Add [I82 4Z1] Lower leg DVT (deep venous thromboembolism), acute, right ([address] )     3/3/2023  4:42 PM Deepika Meagan Add [I82 411] Acute deep vein thrombosis (DVT) of femoral vein of right lower extremity [facility])       ED Disposition     ED Disposition   Admit    Condition   Stable    Date/Time   Fri Mar 3, 2023 12:24 PM    Comment   Case was discussed with Rafael Ngo and the patient's admission status was agreed to be Admission Status: observation status to the service of Dr Rafael Ngo              Follow-up Information    None         Current Discharge Medication List      CONTINUE these medications which have NOT CHANGED    Details   adalimumab (HUMIRA) 40 mg/0 8 mL PSKT Inject 1 Dose under the skin every 14 (fourteen) days      Apremilast 30 MG TABS Take 30 mg by mouth 2 (two) times a day      betamethasone valerate (VALISONE) 0 1 % cream Apply topically 2 (two) times a day  Qty: 15 g, Refills: 2    Associated Diagnoses: Chronic diffuse otitis externa of left ear      Empagliflozin-metFORMIN HCl ER (Synjardy XR) 5-1000 MG TB24 Synjardy XR 5 mg-1,000 mg tablet, extended release      erythromycin (ILOTYCIN) ophthalmic ointment Place a 1/2 inch ribbon of ointment into the lower eyelid 4 times daily for 5 days  Qty: 3 5 g, Refills: 0    Associated Diagnoses: Abrasion of left cornea, initial encounter             No discharge procedures on file      PDMP Review     None          ED Provider  Electronically Signed by           Debbie Andres PA-C  03/03/23 2099

## 2023-03-04 PROBLEM — E83.110 HEREDITARY HEMOCHROMATOSIS (HCC): Status: ACTIVE | Noted: 2023-03-04

## 2023-03-04 LAB
ALBUMIN SERPL BCP-MCNC: 3.5 G/DL (ref 3.5–5)
ALP SERPL-CCNC: 65 U/L (ref 34–104)
ALT SERPL W P-5'-P-CCNC: 11 U/L (ref 7–52)
ANION GAP SERPL CALCULATED.3IONS-SCNC: 5 MMOL/L (ref 4–13)
APTT PPP: 49 SECONDS (ref 23–37)
APTT PPP: 70 SECONDS (ref 23–37)
APTT PPP: 96 SECONDS (ref 23–37)
AST SERPL W P-5'-P-CCNC: 13 U/L (ref 13–39)
BASOPHILS # BLD AUTO: 0.04 THOUSANDS/ÂΜL (ref 0–0.1)
BASOPHILS NFR BLD AUTO: 0 % (ref 0–1)
BILIRUB SERPL-MCNC: 0.56 MG/DL (ref 0.2–1)
BUN SERPL-MCNC: 8 MG/DL (ref 5–25)
CALCIUM SERPL-MCNC: 8.8 MG/DL (ref 8.4–10.2)
CHLORIDE SERPL-SCNC: 104 MMOL/L (ref 96–108)
CO2 SERPL-SCNC: 28 MMOL/L (ref 21–32)
CREAT SERPL-MCNC: 0.81 MG/DL (ref 0.6–1.3)
EOSINOPHIL # BLD AUTO: 0.29 THOUSAND/ÂΜL (ref 0–0.61)
EOSINOPHIL NFR BLD AUTO: 3 % (ref 0–6)
ERYTHROCYTE [DISTWIDTH] IN BLOOD BY AUTOMATED COUNT: 13.2 % (ref 11.6–15.1)
GFR SERPL CREATININE-BSD FRML MDRD: 102 ML/MIN/1.73SQ M
GLUCOSE SERPL-MCNC: 164 MG/DL (ref 65–140)
HCT VFR BLD AUTO: 45.5 % (ref 36.5–49.3)
HGB BLD-MCNC: 15.1 G/DL (ref 12–17)
IMM GRANULOCYTES # BLD AUTO: 0.03 THOUSAND/UL (ref 0–0.2)
IMM GRANULOCYTES NFR BLD AUTO: 0 % (ref 0–2)
LYMPHOCYTES # BLD AUTO: 2.63 THOUSANDS/ÂΜL (ref 0.6–4.47)
LYMPHOCYTES NFR BLD AUTO: 29 % (ref 14–44)
MCH RBC QN AUTO: 33.7 PG (ref 26.8–34.3)
MCHC RBC AUTO-ENTMCNC: 33.2 G/DL (ref 31.4–37.4)
MCV RBC AUTO: 102 FL (ref 82–98)
MONOCYTES # BLD AUTO: 0.68 THOUSAND/ÂΜL (ref 0.17–1.22)
MONOCYTES NFR BLD AUTO: 7 % (ref 4–12)
NEUTROPHILS # BLD AUTO: 5.54 THOUSANDS/ÂΜL (ref 1.85–7.62)
NEUTS SEG NFR BLD AUTO: 61 % (ref 43–75)
NRBC BLD AUTO-RTO: 0 /100 WBCS
PLATELET # BLD AUTO: 132 THOUSANDS/UL (ref 149–390)
PMV BLD AUTO: 10.2 FL (ref 8.9–12.7)
POTASSIUM SERPL-SCNC: 4.2 MMOL/L (ref 3.5–5.3)
PROT SERPL-MCNC: 6.8 G/DL (ref 6.4–8.4)
RBC # BLD AUTO: 4.48 MILLION/UL (ref 3.88–5.62)
SODIUM SERPL-SCNC: 137 MMOL/L (ref 135–147)
WBC # BLD AUTO: 9.21 THOUSAND/UL (ref 4.31–10.16)

## 2023-03-04 RX ORDER — RIVAROXABAN 15 MG-20MG
1 KIT ORAL 2 TIMES DAILY
Qty: 51 EACH | Refills: 0 | Status: SHIPPED | OUTPATIENT
Start: 2023-03-04

## 2023-03-04 RX ADMIN — HEPARIN SODIUM 4000 UNITS: 1000 INJECTION INTRAVENOUS; SUBCUTANEOUS at 10:22

## 2023-03-04 RX ADMIN — FOLIC ACID 1 MG: 1 TABLET ORAL at 10:00

## 2023-03-04 RX ADMIN — Medication 100 MG: at 10:01

## 2023-03-04 RX ADMIN — HEPARIN SODIUM 16 UNITS/KG/HR: 10000 INJECTION, SOLUTION INTRAVENOUS at 02:33

## 2023-03-04 RX ADMIN — MULTIPLE VITAMINS W/ MINERALS TAB 1 TABLET: TAB ORAL at 10:00

## 2023-03-04 RX ADMIN — HEPARIN SODIUM 18 UNITS/KG/HR: 10000 INJECTION, SOLUTION INTRAVENOUS at 17:34

## 2023-03-04 RX ADMIN — NICOTINE 2 PATCH: 21 PATCH, EXTENDED RELEASE TRANSDERMAL at 09:00

## 2023-03-04 RX ADMIN — NALTREXONE HYDROCHLORIDE 50 MG: 50 TABLET, FILM COATED ORAL at 10:02

## 2023-03-04 NOTE — ASSESSMENT & PLAN NOTE
69-year-old male with PMH alcohol abuse, tobacco abuse and Right lower DVT  Pt states he does re-call a recent trauma to his right leg, fell from a tree stand and sprained his R ankle in January      -3/3/2023 LEV demonstrates acute occlusive DVT in the distal femoral,  popliteal, PT, AT, peroneal, and gastrocnemius veins  Also noted is a  free-floating, non-occlusive acute deep vein thrombosis noted in the mid femoral vein  Plan  Continue with anticoagulation  Currently on heparin gtt can transition to [address] as per Hem/Onc  No surgical intervention recommended at this time  D/W Dr Molina  Encouraged to continue with leg elevation  No activity restriction

## 2023-03-04 NOTE — UTILIZATION REVIEW
thanksInitial Clinical Review    Admission: Date/Time/Statement: 3/3/23 1227 observation   Admission Orders (From admission, onward)     Ordered        03/03/23 1227  Place in Observation  Once                      Orders Placed This Encounter   Procedures   • Place in Observation     Standing Status:   Standing     Number of Occurrences:   1     Order Specific Question:   Level of Care     Answer:   Med Surg [16]     ED Arrival Information     Expected   -    Arrival   3/3/2023 07:47    Acuity   Less Urgent            Means of arrival   Wheelchair    Escorted by   Spouse    Service   Hospitalist    Admission type   Emergency            Arrival complaint   leg pain           Chief Complaint   Patient presents with   • Leg Pain     Jumped over a trench 2  days ago increase pain in right leg since then        Initial Presentation: 47 y o  male from home to ED admitted to observation due to right LE DVT  Presented due to pain in rle starting about 2 days prior to arrival after jumping over a trench, pain and swelling RLE increased last 24 hours  Drinks 2 alcoholic drinks daily  On exam swelling of right calf  Glucose 214  Venous doppler - an extensive acute occlusion DVT in the right lower extremity distal femoral, popliteal and calf veins  He also has free-floating thrombus in the mid femoral vein and it is 6 cm from the common femoral  In the ED bolus with IV Heparin and placed on drip  Plan is continued IV Heparin infusion  Consult to vascular and heme  CIWA, continue thiamine, folate and MVI    3/3/23 per vascular - patient with RLE DVT  Plan is no vascular intervention  On heparin gtt, and at dc po anticoagulation  No need thrombectomy at this time  3/3/23 per heme - patient with unprovoked acute occlusive DVT, history of hemachromatosis diagnosed 2008    Recommend IVF filter as has free-floating clot in the lower extremities,  concern for possible propagation of the clot that could result in a PE   DOAC       3/3/23 per IR - patient with acute DVT RLE  If patient can tolerate anticoagulation then a filter is not necessary  3/4/23 observation:  Has continued RLE pain and swelling  On exam: RLE swelling  H&H 15/1/45  5  Continue anticoagulation  Leg elevation  Discussion with vascular and heme and decision to not place IVF filter  3/5/23 observation     ED Triage Vitals [03/03/23 0755]   Temperature Pulse Respirations Blood Pressure SpO2   98 1 °F (36 7 °C) 85 18 142/84 98 %      Temp Source Heart Rate Source Patient Position - Orthostatic VS BP Location FiO2 (%)   Oral Monitor Sitting Right arm --      Pain Score       8          Wt Readings from Last 1 Encounters:   03/03/23 102 kg (225 lb)     Additional Vital Signs:   03/04/23 21:05:12 98 °F (36 7 °C) 73 18 125/80 95 97 % None (Room air) Lying   03/04/23 17:24:09 -- 66 17 126/82 97 96 %       03/04/23 07:47:01 98 2 °F (36 8 °C) 81 16 122/82 95 98 % -- Lying   03/03/23 21:42:28 98 °F (36 7 °C) 70 16 118/83 95 97 % None (Room air) Lying   03/03/23 19:38:07 97 9 °F (36 6 °C) 85 -- 130/82 98 96 % None (Room air) --   03/03/23 14:28:52 98 6 °F (37 °C) 84 17 134/101 Abnormal  112 98 % -- --   03/03/23 1112 98 6 °F (37 °C) 70 16 132/79 -- 97 % None (Room air)      CIWA   3/3/23 - 2 at 1938    Pertinent Labs/Diagnostic Test Results:   XR chest 1 view portable   Final Result by Mildred Andres MD (03/03 1319)      No acute cardiopulmonary disease  Workstation performed: [id number]         VAS lower limb venous duplex study, unilateral/limited   Final Result by Mariana Hebert MD (03/03 2108)   RIGHT LOWER LIMB  Evidence of acute occlusive deep vein thrombosis noted in the distal femoral,  popliteal, posterior tibial, anterior tibial, peroneal, and gastrocnemius veins  There is free-floating, non-occlusive acute deep vein thrombosis noted in the  mid femoral vein    No evidence of superficial thrombophlebitis noted  Popliteal, posterior tibial and anterior tibial arterial Doppler waveforms are  triphasic  LEFT LOWER LIMB LIMITED  Evaluation shows no evidence of thrombus in the common femoral, femoral, deep  femoral, and popliteal veins  Doppler evaluation shows a normal response to augmentation maneuvers  Results from last 7 days   Lab Units 03/05/23  0550 03/04/23  0846 03/03/23  0850   WBC Thousand/uL 7 94 9 21 9 52   HEMOGLOBIN g/dL 16 0 15 1 16 0   HEMATOCRIT % 47 3 45 5 47 2   PLATELETS Thousands/uL 147* 132* 129*   NEUTROS ABS Thousands/µL 4 57 5 54 6 58     Results from last 7 days   Lab Units 03/05/23  0550 03/04/23  0846 03/03/23  0850   SODIUM mmol/L 135 137 134*   POTASSIUM mmol/L 4 0 4 2 4 3   CHLORIDE mmol/L 103 104 101   CO2 mmol/L 25 28 28   ANION GAP mmol/L 7 5 5   BUN mg/dL 9 8 8   CREATININE mg/dL 0 85 0 81 0 90   EGFR ml/min/1 73sq m 100 102 98   CALCIUM mg/dL 8 7 8 8 9 3     Results from last 7 days   Lab Units 03/04/23  0846 03/03/23  0850   AST U/L 13 13   ALT U/L 11 12   ALK PHOS U/L 65 76   TOTAL PROTEIN g/dL 6 8 7 5   ALBUMIN g/dL 3 5 3 9   TOTAL BILIRUBIN mg/dL 0 56 1 22*     Results from last 7 days   Lab Units 03/05/23  0550 03/04/23  0846 03/03/23  0850   GLUCOSE RANDOM mg/dL 183* 164* 214*     Results from last 7 days   Lab Units 03/05/23  0550 03/04/23  2313 03/04/23  1718 03/03/23  1925 03/03/23  1029   PROTIME seconds  --   --   --   --  13 5   INR   --   --   --   --  1 00   PTT seconds 67* 63* 70*   < >  --     < > = values in this interval not displayed       ED Treatment:   Medication Administration from 03/03/2023 0747 to 03/03/2023 1424       Date/Time Order Dose Route Action Comments     03/03/2023 0855 EST ketorolac (TORADOL) injection 15 mg 15 mg Intravenous Given --     03/03/2023 1309 EST heparin (porcine) injection 8,000 Units 8,000 Units Intravenous Given --     03/03/2023 1311 EST heparin (porcine) 25,000 units in 0 45% NaCl 250 mL infusion (premix) 18 Units/kg/hr Intravenous New Bag --        Past Medical History:   Diagnosis Date   • Ear problems    • HL (hearing loss)    • Psoriasis      Present on Admission:  **None**      Admitting Diagnosis: Hemochromatosis [E83 119]  Leg pain [M79 606]  Smoker [F17 200]  DVT (deep venous thrombosis) (HCC) [I82 409]  Age/Sex: 47 y o  male  Admission Orders:  Scheduled Medications:  folic acid, 1 mg, Oral, Daily  multivitamin-minerals, 1 tablet, Oral, Daily  naltrexone, 50 mg, Oral, Daily  nicotine, 2 patch, Transdermal, Daily  thiamine, 100 mg, Oral, Daily    Continuous IV Infusions:  heparin (porcine), 3-30 Units/kg/hr (Order-Specific), Intravenous, Titrated    PRN Meds:  acetaminophen, 650 mg, Oral, Q4H PRN  heparin (porcine), 4,000 Units, Intravenous, Q6H PRN x 1 3/4/23  heparin (porcine), 8,000 Units, Intravenous, Q6H PRN  nicotine polacrilex, 2 mg, Oral, Q2H PRN x 1 3/3    Thigh high compression stocking  Elevate RLE  Continuous pulse oximetry  CIWA  Continuous cardiac monitoring     IP CONSULT TO VASCULAR SURGERY  IP CONSULT TO HEMATOLOGY  INPATIENT CONSULT TO IR    Network Utilization Review Department  ATTENTION: Please call with any questions or concerns to [phone] and carefully listen to the prompts so that you are directed to the right person  All voicemails are confidential   [facility]el[facility] Brain all requests for admission clinical reviews, approved or denied determinations and any other requests to dedicated fax number below belonging to the campus where the patient is receiving treatment   List of dedicated fax numbers for the Facilities:  [address] DENIALS (Administrative/Medical Necessity) [phone]   [address] (Maternity/NICU/Pediatrics) [phone]   [address] [phone]   [place] [address] [phone]   [address], [address] [address] Jing Mehta [facility] 28 [address] [facility] 310 [facility] 134 945 [facility] [phone]

## 2023-03-04 NOTE — PROGRESS NOTES
[facility]  Progress Note - Lucy Kim 1970, 47 y o  male MRN: [id number]  Unit/Bed#: W -01 Encounter: [id number]  Primary Care Provider: Maxime Gonzales MD   Date and time admitted to hospital: 3/3/2023  7:49 AM    * Lower leg DVT (deep venous thromboembolism), acute, right [facility])  Assessment & Plan  Patient presented 1 day history of right lower extremity pain and swelling  Ultrasound revealed acute occlusive deep vein thrombosis in the multiple veins  He denied any recent surgery or prolonged car travel or ill mobility however he did have an ankle sprain in January  He does have a history of plaque psoriasis and recently started on Skyrizi and a history of hemochromatosis  · Hematology consulted and recommended IVC filter as well as anticoagulation  · Discussed with interventional radiology who will perform the procedure this weekend as able with emergencies  · Patient is n p o  after midnight  · Xarelto price check today is $30 per month or $60 for 90-day mail-in prescription  The first 30 days is free  I discussed this with the patient and he is agreeable  · Await decision by IR if okay to switch to Xarelto today versus preference to stay on heparin drip through the procedure  Hereditary hemochromatosis [facility])  Assessment & Plan  Will require hematology follow-up  Tobacco abuse  Assessment & Plan  Reports smoking 3 packs a day  Continue nicotine replacement therapy     Alcohol abuse  Assessment & Plan  Patient has a history of Heavy alcohol use and is on naltrexone 50 mg daily  He is maintained on a CIWA protocol continue thiamine and folate  Plaque psoriasis  Assessment & Plan  Maintained on Skyrizi         VTE Pharmacologic Prophylaxis: VTE Score: 8 High Risk (Score >/= 5) - Pharmacological DVT Prophylaxis Ordered: heparin drip  Sequential Compression Devices Ordered      Patient Centered Rounds: I performed bedside rounds with nursing staff today   Discussions with Specialists or Other Care Team Provider: Hematology, interventional radiology    Education and Discussions with Family / Patient: Updated  (wife) at bedside  Total Time Spent on Date of Encounter in care of patient: 35 minutes This time was spent on one or more of the following: performing physical exam; counseling and coordination of care; obtaining or reviewing history; documenting in the medical record; reviewing/ordering tests, medications or procedures; communicating with other healthcare professionals and discussing with patient's family/caregivers  Current Length of Stay: 0 day(s)  Current Patient Status: Observation   Certification Statement: The patient will continue to require additional inpatient hospital stay due to IVC filter placement  Discharge Plan: Anticipate discharge tomorrow to home  Code Status: Level 1 - Full Code    Subjective:   He denies any chest pain or shortness of breath  We have a long discussion on the fact that he does not like being hospitalized and really request the procedure be done today  I did explain to him the nature of interventional radiology on the weekend and covering several hospitals and emergencies  Discussed with interventional radiology who will fit in the case as able and is hopeful for tomorrow morning  He denies any hematuria, bright red blood per rectum or hemoptysis  He denies any headache or back pain  He feels the discomfort in his right leg is mildly improved  Objective:     Vitals:   Temp (24hrs), Av 2 °F (36 8 °C), Min:97 9 °F (36 6 °C), Max:98 6 °F (37 °C)    Temp:  [97 9 °F (36 6 °C)-98 6 °F (37 °C)] 98 2 °F (36 8 °C)  HR:  [70-85] 81  Resp:  [16-17] 16  BP: (118-134)/() 122/82  SpO2:  [96 %-98 %] 98 %  Body mass index is 27 39 kg/m²  Input and Output Summary (last 24 hours):      Intake/Output Summary (Last 24 hours) at 3/4/2023 1427  Last data filed at 3/4/2023 1218  Gross per 24 hour Intake 240 ml   Output --   Net 240 ml       Physical Exam:   Physical Exam  Vitals and nursing note reviewed  Constitutional:       Appearance: Normal appearance  He is normal weight  HENT:      Head: Normocephalic  Nose: Nose normal  No congestion  Mouth/Throat:      Comments: Poor dentition  Eyes:      General: No scleral icterus  Conjunctiva/sclera: Conjunctivae normal    Pulmonary:      Effort: Pulmonary effort is normal  No respiratory distress  Abdominal:      General: Bowel sounds are normal  There is no distension  Palpations: Abdomen is soft  Tenderness: There is no abdominal tenderness  Genitourinary:     Comments: No Singletary  Musculoskeletal:      Cervical back: Normal range of motion and neck supple  No rigidity  Right lower leg: Edema present  Left lower leg: No edema  Skin:     General: Skin is warm  Coloration: Skin is not jaundiced  Neurological:      Mental Status: He is alert and oriented to person, place, and time     Psychiatric:         Mood and Affect: Mood normal          Behavior: Behavior normal           Additional Data:     Labs:  Results from last 7 days   Lab Units 03/04/23  0846   WBC Thousand/uL 9 21   HEMOGLOBIN g/dL 15 1   HEMATOCRIT % 45 5   PLATELETS Thousands/uL 132*   NEUTROS PCT % 61   LYMPHS PCT % 29   MONOS PCT % 7   EOS PCT % 3     Results from last 7 days   Lab Units 03/04/23  0846   SODIUM mmol/L 137   POTASSIUM mmol/L 4 2   CHLORIDE mmol/L 104   CO2 mmol/L 28   BUN mg/dL 8   CREATININE mg/dL 0 81   ANION GAP mmol/L 5   CALCIUM mg/dL 8 8   ALBUMIN g/dL 3 5   TOTAL BILIRUBIN mg/dL 0 56   ALK PHOS U/L 65   ALT U/L 11   AST U/L 13   GLUCOSE RANDOM mg/dL 164*     Results from last 7 days   Lab Units 03/03/23  1029   INR  1 00                   Lines/Drains:  Invasive Devices     Peripheral Intravenous Line  Duration           Peripheral IV 03/03/23 Right Antecubital 1 day                      Imaging: Personally reviewed the following imaging: ultrasound(s)    Recent Cultures (last 7 days):         Last 24 Hours Medication List:   Current Facility-Administered Medications   Medication Dose Route Frequency Provider Last Rate   • acetaminophen  650 mg Oral Q4H PRN Ronel Forte MD     • folic acid  1 mg Oral Daily Ronel Forte MD     • heparin (porcine)  3-30 Units/kg/hr (Order-Specific) Intravenous Titrated Ronel Forte MD 18 Units/kg/hr (03/04/23 1024)   • heparin (porcine)  4,000 Units Intravenous Q6H PRN Ronel Forte MD     • heparin (porcine)  8,000 Units Intravenous Q6H PRN Ronel Forte MD     • multivitamin-minerals  1 tablet Oral Daily Ronel Forte MD     • naltrexone  50 mg Oral Daily Ronel Forte MD     • nicotine  2 patch Transdermal Daily Ronel Forte MD     • nicotine polacrilex  2 mg Oral Q2H PRN Gareth Browning PA-C     • thiamine  100 mg Oral Daily Ronel Forte MD          Today, Patient Was Seen By: Lavon Aguirre MD    **Please Note: This note may have been constructed using a voice recognition system  **

## 2023-03-04 NOTE — PROGRESS NOTES
[facility]  Progress Note - Ana Montgomery 1970, 47 y o  male MRN: [id number]  Unit/Bed#: W -01 Encounter: [id number]  Primary Care Provider: Venice Matt MD   Date and time admitted to hospital: 3/3/2023  7:49 AM    * Lower leg DVT (deep venous thromboembolism), acute, right [facility])  Assessment & Plan  69-year-old male with PMH alcohol abuse, tobacco abuse and Right lower DVT  Pt states he does re-call a recent trauma to his right leg, fell from a tree stand and sprained his R ankle in January      -3/3/2023 LEV demonstrates acute occlusive DVT in the distal femoral,  popliteal, PT, AT, peroneal, and gastrocnemius veins  Also noted is a  free-floating, non-occlusive acute deep vein thrombosis noted in the mid femoral vein  Plan  Continue with anticoagulation  Currently on heparin gtt can transition to [address] as per Hem/Onc  No surgical intervention recommended at this time  D/W Dr Molina  Encouraged to continue with leg elevation  No activity restriction  Vitals:  /82 (BP Location: Left arm)   Pulse 81   Temp 98 2 °F (36 8 °C) (Oral)   Resp 16   Wt 102 kg (225 lb)   SpO2 98%   BMI 27 39 kg/m²     I/Os:  No intake/output data recorded    I/O this shift:  In: 240 [P O :240]  Out: -         Lab Results and Cultures:   CBC with diff: Lab Results   Component Value Date    WBC 9 21 03/04/2023    HGB 15 1 03/04/2023    HCT 45 5 03/04/2023     (H) 03/04/2023     (L) 03/04/2023    MCH 33 7 03/04/2023    MCHC 33 2 03/04/2023    RDW 13 2 03/04/2023    MPV 10 2 03/04/2023    NRBC 0 03/04/2023   ,   BMP/CMP:  Lab Results   Component Value Date    SODIUM 137 03/04/2023    K 4 2 03/04/2023     03/04/2023    CO2 28 03/04/2023    BUN 8 03/04/2023    CREATININE 0 81 03/04/2023    CALCIUM 8 8 03/04/2023    AST 13 03/04/2023    ALT 11 03/04/2023    ALKPHOS 65 03/04/2023    EGFR 102 03/04/2023   ,   Lipid Panel: No results found for: CHOL,   Coags:   Lab Results Component Value Date    PTT 49 (H) 03/04/2023    INR 1 00 03/03/2023   ,     Blood Culture: No results found for: BLOODCX,   Urinalysis: Lab Results   Component Value Date    COLORU Yellow 01/21/2023    CLARITYU Clear 01/21/2023    SPECGRAV 1 028 01/21/2023    PHUR 5 5 01/21/2023    LEUKOCYTESUR (A) 01/21/2023     Elevated glucose may cause decreased leukocyte values  See urine microscopic for [facility] result/    NITRITE Negative 01/21/2023    GLUCOSEU >=1000 (1%) (A) 01/21/2023    KETONESU Trace (A) 01/21/2023    BILIRUBINUR Negative 01/21/2023    BLOODU Negative 01/21/2023   ,   Urine Culture: No results found for: URINECX,   Wound Culure: No results found for: WOUNDCULT    Medications:  Current Facility-Administered Medications   Medication Dose Route Frequency   • acetaminophen (TYLENOL) tablet 650 mg  650 mg Oral V9J PRN   • folic acid (FOLVITE) tablet 1 mg  1 mg Oral Daily   • heparin (porcine) 25,000 units in 0 45% NaCl 250 mL infusion (premix)  3-30 Units/kg/hr (Order-Specific) Intravenous Titrated   • heparin (porcine) injection 4,000 Units  4,000 Units Intravenous Q6H PRN   • heparin (porcine) injection 8,000 Units  8,000 Units Intravenous Q6H PRN   • multivitamin-minerals (CENTRUM) tablet 1 tablet  1 tablet Oral Daily   • naltrexone (REVIA) tablet 50 mg  50 mg Oral Daily   • nicotine (NICODERM CQ) 21 mg/24 hr TD 24 hr patch 2 patch  2 patch Transdermal Daily   • nicotine polacrilex (NICORETTE) gum 2 mg  2 mg Oral Q2H PRN   • thiamine tablet 100 mg  100 mg Oral Daily         Physical Exam:    General: Pt is alert and oriented x4  CV: Clear S1, S2 heart sounds  Regular rhythm  Respiratory: Clear lung sounds b/l  Abdominal: Soft NT abdomen  Extremities: R lower extremity pain and swelling   Neurologic: No motor deficits, denies numbness and tingling       Pulse exam:  Radial: Right: +2 Left[de-identified] +2  Femoral: Right: +2 Left: +2  DP: Right: +2 Left: +2  PT: Right: +2 Left: +2    Ranjana Allen CHRISSIE  3/4/2023

## 2023-03-04 NOTE — CONSULTS
Interventional Radiology  Consultation 3/3/2023     Inpatient Consult to IR  Consult performed by: Alice Leo MD  Consult ordered by: Sarah Dixon MD        Corbin Glze   1970   [id number]      Assessment/Plan:  47year old male admitted with leg swelling  Found to have right LE DVT  On anticoagulation currently and tolerating  IR consulted due to free floating thrombus in mid femoral vein  If a patient can tolerate anticoagulation, then a filter is usually not necessary  However a "soft" indication for IVC filter placement is large free floating thrombus  Either anticoagulation alone or anticoagulation with IVC filter in this scenario falls with in standard of care  Medical Problems     Problem List     * (Principal) Lower leg DVT (deep venous thromboembolism), acute, right (HCC)    Plaque psoriasis    Alcohol abuse    Tobacco abuse            Subjective:     Patient ID: Corbin Glez is a 47 y o  male  History of Present Illness  47year old male admitted with leg swelling  Found to have right LE DVT  On anticoagulation currently and tolerating  IR consulted due to free floating thrombus in mid femoral vein  If a patient can tolerate anticoagulation, then a filter is usually not necessary  However a "soft" indication for IVC filter placement is large free floating thrombus  Either anticoagulation alone or anticoagulation with IVC filter in this scenario falls with in standard of care      Review of Systems  as above    Past Medical History:   Diagnosis Date   • Ear problems    • HL (hearing loss)    • Psoriasis         Past Surgical History:   Procedure Laterality Date   • CHOLECYSTECTOMY     • GASTRIC BYPASS          Social History     Tobacco Use   Smoking Status Every Day   • Packs/day: 1 00   • Types: Cigarettes   Smokeless Tobacco Never        Social History     Substance and Sexual Activity   Alcohol Use Yes   • Alcohol/week: 21 0 standard drinks   • Types: 21 Cans of beer per week Comment: average 3 a  day        Social History     Substance and Sexual Activity   Drug Use No        Allergies   Allergen Reactions   • Codeine        Current Facility-Administered Medications   Medication Dose Route Frequency Provider Last Rate Last Admin   • acetaminophen (TYLENOL) tablet 650 mg  650 mg Oral Q4H PRN Vaughn Marks MD       • folic acid (FOLVITE) tablet 1 mg  1 mg Oral Daily Vaughn Marks MD   1 mg at 03/03/23 1852   • heparin (porcine) 25,000 units in 0 45% NaCl 250 mL infusion (premix)  3-30 Units/kg/hr (Order-Specific) Intravenous Titrated Vaughn Marks MD 18 mL/hr at 03/03/23 1930 18 Units/kg/hr at 03/03/23 1930   • heparin (porcine) injection 4,000 Units  4,000 Units Intravenous Q6H PRN Vaughn Marks MD       • heparin (porcine) injection 8,000 Units  8,000 Units Intravenous Q6H PRN Vaughn Marks MD       • multivitamin-minerals (CENTRUM) tablet 1 tablet  1 tablet Oral Daily Vaughn Marks MD   1 tablet at 03/03/23 1856   • naltrexone (REVIA) tablet 50 mg  50 mg Oral Daily Vaughn Marks MD   50 mg at 03/03/23 1934   • nicotine (NICODERM CQ) 21 mg/24 hr TD 24 hr patch 2 patch  2 patch Transdermal Daily Vaughn Marks MD   2 patch at 03/03/23 1614   • thiamine tablet 100 mg  100 mg Oral Daily Vaughn Marks MD   100 mg at 03/03/23 1856          Objective:    Vitals:    03/03/23 0755 03/03/23 1112 03/03/23 1428 03/03/23 1938   BP: 142/84 132/79 (!) 134/101 130/82   BP Location: Right arm Right arm     Pulse: 85 70 84 85   Resp: 18 16 17    Temp: 98 1 °F (36 7 °C) 98 6 °F (37 °C) 98 6 °F (37 °C) 97 9 °F (36 6 °C)   TempSrc: Oral Oral     SpO2: 98% 97% 98% 96%   Weight: 102 kg (225 lb)           Physical Exam  Not performed    No results found for: BNP   Lab Results   Component Value Date    WBC 9 52 03/03/2023    HGB 16 0 03/03/2023    HCT 47 2 03/03/2023     (H) 03/03/2023     (L) 03/03/2023     Lab Results   Component Value Date    INR 1 00 03/03/2023    PROTIME 13 5 03/03/2023     No results found for: PTT      I have personally reviewed pertinent imaging and laboratory results  Code Status: Level 1 - Full Code  Advance Directive and Living Will:      Power of :    POLST:      IR has been consulted to evaluate the patient, determine the appropriate procedure, and whether or not a procedure can and should be performed  10 minutes spent reviewing the patient's chart, imaging and formulating a plan  Thank you for allowing me to participate in the care of Corbin Glez  Please don't hesitate to call, text, email, or TigerText with any questions  This text is generated with voice recognition software  There may be translation, syntax,  or grammatical errors  If you have any questions, please contact the dictating provider

## 2023-03-04 NOTE — ASSESSMENT & PLAN NOTE
Patient has a history of Heavy alcohol use and is on naltrexone 50 mg daily  He is maintained on a CIWA protocol continue thiamine and folate

## 2023-03-04 NOTE — ASSESSMENT & PLAN NOTE
Patient presented 1 day history of right lower extremity pain and swelling  Ultrasound revealed acute occlusive deep vein thrombosis in the multiple veins  He denied any recent surgery or prolonged car travel or ill mobility however he did have an ankle sprain in January  He does have a history of plaque psoriasis and recently started on Skyrizi and a history of hemochromatosis  · Hematology consulted and recommended IVC filter as well as anticoagulation  · Discussed with interventional radiology who will perform the procedure this weekend as able with emergencies  · Patient is n p o  after midnight  · Xarelto price check today is $30 per month or $60 for 90-day mail-in prescription  The first 30 days is free  I discussed this with the patient and he is agreeable  · Await decision by IR if okay to switch to Xarelto today versus preference to stay on heparin drip through the procedure

## 2023-03-05 ENCOUNTER — APPOINTMENT (OUTPATIENT)
Dept: RADIOLOGY | Facility: HOSPITAL | Age: 53
End: 2023-03-05
Attending: RADIOLOGY

## 2023-03-05 VITALS
RESPIRATION RATE: 20 BRPM | BODY MASS INDEX: 27.39 KG/M2 | WEIGHT: 225 LBS | HEART RATE: 70 BPM | OXYGEN SATURATION: 95 % | TEMPERATURE: 98.6 F | DIASTOLIC BLOOD PRESSURE: 66 MMHG | SYSTOLIC BLOOD PRESSURE: 122 MMHG

## 2023-03-05 LAB
ANION GAP SERPL CALCULATED.3IONS-SCNC: 7 MMOL/L (ref 4–13)
APTT PPP: 63 SECONDS (ref 23–37)
APTT PPP: 67 SECONDS (ref 23–37)
APTT SCREEN TO CONFIRM RATIO: 0.99 RATIO (ref 0–1.34)
BASOPHILS # BLD AUTO: 0.05 THOUSANDS/ÂΜL (ref 0–0.1)
BASOPHILS NFR BLD AUTO: 1 % (ref 0–1)
BUN SERPL-MCNC: 9 MG/DL (ref 5–25)
CALCIUM SERPL-MCNC: 8.7 MG/DL (ref 8.4–10.2)
CHLORIDE SERPL-SCNC: 103 MMOL/L (ref 96–108)
CO2 SERPL-SCNC: 25 MMOL/L (ref 21–32)
CONFIRM APTT/NORMAL: 43.2 SEC (ref 0–47.6)
CREAT SERPL-MCNC: 0.85 MG/DL (ref 0.6–1.3)
EOSINOPHIL # BLD AUTO: 0.29 THOUSAND/ÂΜL (ref 0–0.61)
EOSINOPHIL NFR BLD AUTO: 4 % (ref 0–6)
ERYTHROCYTE [DISTWIDTH] IN BLOOD BY AUTOMATED COUNT: 13.1 % (ref 11.6–15.1)
GFR SERPL CREATININE-BSD FRML MDRD: 100 ML/MIN/1.73SQ M
GLUCOSE P FAST SERPL-MCNC: 183 MG/DL (ref 65–99)
GLUCOSE SERPL-MCNC: 183 MG/DL (ref 65–140)
HCT VFR BLD AUTO: 47.3 % (ref 36.5–49.3)
HGB BLD-MCNC: 16 G/DL (ref 12–17)
IMM GRANULOCYTES # BLD AUTO: 0.02 THOUSAND/UL (ref 0–0.2)
IMM GRANULOCYTES NFR BLD AUTO: 0 % (ref 0–2)
LA PPP-IMP: NORMAL
LYMPHOCYTES # BLD AUTO: 2.42 THOUSANDS/ÂΜL (ref 0.6–4.47)
LYMPHOCYTES NFR BLD AUTO: 31 % (ref 14–44)
MCH RBC QN AUTO: 33.5 PG (ref 26.8–34.3)
MCHC RBC AUTO-ENTMCNC: 33.8 G/DL (ref 31.4–37.4)
MCV RBC AUTO: 99 FL (ref 82–98)
MONOCYTES # BLD AUTO: 0.59 THOUSAND/ÂΜL (ref 0.17–1.22)
MONOCYTES NFR BLD AUTO: 7 % (ref 4–12)
NEUTROPHILS # BLD AUTO: 4.57 THOUSANDS/ÂΜL (ref 1.85–7.62)
NEUTS SEG NFR BLD AUTO: 57 % (ref 43–75)
NRBC BLD AUTO-RTO: 0 /100 WBCS
PLATELET # BLD AUTO: 147 THOUSANDS/UL (ref 149–390)
PMV BLD AUTO: 10 FL (ref 8.9–12.7)
POTASSIUM SERPL-SCNC: 4 MMOL/L (ref 3.5–5.3)
PROT S ACT/NOR PPP: 123 % (ref 61–136)
PROT S PPP-ACNC: 117 % (ref 60–150)
RBC # BLD AUTO: 4.78 MILLION/UL (ref 3.88–5.62)
SCREEN APTT: 40.9 SEC (ref 0–43.5)
SCREEN DRVVT: 42.2 SEC (ref 0–47)
SODIUM SERPL-SCNC: 135 MMOL/L (ref 135–147)
THROMBIN TIME: 14 SEC (ref 0–23)
WBC # BLD AUTO: 7.94 THOUSAND/UL (ref 4.31–10.16)

## 2023-03-05 RX ORDER — MULTIVITAMIN
1 CAPSULE ORAL DAILY
Refills: 0
Start: 2023-03-05

## 2023-03-05 RX ORDER — FENTANYL CITRATE 50 UG/ML
INJECTION, SOLUTION INTRAMUSCULAR; INTRAVENOUS AS NEEDED
Status: COMPLETED | OUTPATIENT
Start: 2023-03-05 | End: 2023-03-05

## 2023-03-05 RX ORDER — MIDAZOLAM HYDROCHLORIDE 2 MG/2ML
INJECTION, SOLUTION INTRAMUSCULAR; INTRAVENOUS AS NEEDED
Status: COMPLETED | OUTPATIENT
Start: 2023-03-05 | End: 2023-03-05

## 2023-03-05 RX ORDER — LIDOCAINE HYDROCHLORIDE 10 MG/ML
INJECTION, SOLUTION EPIDURAL; INFILTRATION; INTRACAUDAL; PERINEURAL AS NEEDED
Status: COMPLETED | OUTPATIENT
Start: 2023-03-05 | End: 2023-03-05

## 2023-03-05 RX ADMIN — IOHEXOL 30 ML: 350 INJECTION, SOLUTION INTRAVENOUS at 11:10

## 2023-03-05 RX ADMIN — FENTANYL CITRATE 25 MCG: 50 INJECTION, SOLUTION INTRAMUSCULAR; INTRAVENOUS at 10:13

## 2023-03-05 RX ADMIN — RIVAROXABAN 15 MG: 15 TABLET, FILM COATED ORAL at 13:04

## 2023-03-05 RX ADMIN — MIDAZOLAM HYDROCHLORIDE 0.5 MG: 1 INJECTION, SOLUTION INTRAMUSCULAR; INTRAVENOUS at 10:13

## 2023-03-05 RX ADMIN — LIDOCAINE HYDROCHLORIDE 10 ML: 10 INJECTION, SOLUTION EPIDURAL; INFILTRATION; INTRACAUDAL at 10:20

## 2023-03-05 RX ADMIN — FENTANYL CITRATE 25 MCG: 50 INJECTION, SOLUTION INTRAMUSCULAR; INTRAVENOUS at 10:25

## 2023-03-05 RX ADMIN — HEPARIN SODIUM 18 UNITS/KG/HR: 10000 INJECTION, SOLUTION INTRAVENOUS at 08:09

## 2023-03-05 RX ADMIN — MIDAZOLAM HYDROCHLORIDE 0.5 MG: 1 INJECTION, SOLUTION INTRAMUSCULAR; INTRAVENOUS at 10:25

## 2023-03-05 RX ADMIN — MIDAZOLAM HYDROCHLORIDE 1 MG: 1 INJECTION, SOLUTION INTRAMUSCULAR; INTRAVENOUS at 10:05

## 2023-03-05 RX ADMIN — FENTANYL CITRATE 50 MCG: 50 INJECTION, SOLUTION INTRAMUSCULAR; INTRAVENOUS at 10:05

## 2023-03-05 NOTE — ASSESSMENT & PLAN NOTE
Patient presented 1 day history of right lower extremity pain and swelling  Ultrasound revealed acute occlusive deep vein thrombosis in the multiple veins  He denied any recent surgery or prolonged car travel or ill mobility however he did have an ankle sprain in January  He does have a history of plaque psoriasis and recently started on Skyrizi and a history of hemochromatosis  · Hematology consulted and recommended IVC filter as well as anticoagulation  Later in the afternoon yesterday vascular surgery had noted the indication was more of a soft indication to place the IVC filter and that it was not officially recommended  The hematologist over the weekend on-call agreed  The Friday consultant discussed with the patient and wife at bedside her concerns and conveyed these in her signed note of the need for a 3-month reversible filter  I then discussed this with the vascular surgeon who agreed to this as well as the patient requiring close follow-up  I discussed this with the patient this morning, the fact that the indications are not set in stone for the procedure and the mixed opinions  He felt confident and comfortable with the discussion he had on Friday to proceed with IVC filter and understood the need to remove the filter in 3 months  I did convey to him leaving the filter in can cause further thrombosis and clotting and thus removing it is important  Though he will be officially consented by the interventional team, we did talk about the inherent risks and any procedure that is entering the blood vessels  · Discussed with interventional radiology who performed the procedure today  · Xarelto price check today is $30 per month or $60 for 90-day mail-in prescription  The first 30 days is free  I discussed this with the patient and he is agreeable  I will start his Xarelto just after the procedure and I did discuss this with the nurse    His wife plans to pick it up at our pharmacy before 1 PM

## 2023-03-05 NOTE — BRIEF OP NOTE (RAD/CATH)
INTERVENTIONAL RADIOLOGY PROCEDURE NOTE    Date: 3/5/2023    Procedure:   Procedure Summary     Date:  Room / Location:     Anesthesia Start:  Anesthesia Stop:     Procedure:  Diagnosis:     Scheduled Providers:  Responsible Provider:     Anesthesia Type: Not recorded ASA Status: Not recorded          Preoperative diagnosis:   1  Acute deep vein thrombosis (DVT) of femoral vein of right lower extremity (HCC)    2  DVT (deep vein thrombosis) in pregnancy    3  Hemochromatosis    4  Smoker    5  Lower leg DVT (deep venous thromboembolism), acute, right (HCC)         Postoperative diagnosis: Same  Surgeon: Roxana Zepeda MD     Assistant: None  No qualified resident was available  Blood loss: Minimal    Specimens: None     Findings: IVC filter placed  Placed low in IVC due to second renal vein as seen on CT and venogram     Complications: None immediate      Anesthesia: conscious sedation

## 2023-03-05 NOTE — ASSESSMENT & PLAN NOTE
Reports smoking 3 packs a day    Continue nicotine replacement therapy while in the hospital   He did not appear in the contemplation stage of quitting and plans to resume smoking on discharge

## 2023-03-05 NOTE — DISCHARGE INSTR - AVS FIRST PAGE
Dear Zuleyka Douglas,     It was our pleasure to care for you here at [facility]  It is our hope that we were always able to exceed the expected standards for your care during your stay  You were hospitalized due to Deep vein thrombosis otherwise known as a clot in your leg  You were cared for on the medicine floor under the service of Ruth Roman MD with the [address] [place] [address] [facility] who covers for your primary care physician (PCP), Lorenzo Ochoa MD, while you were hospitalized  If you have any questions or concerns related to this hospitalization, you may contact us at [phone]  For follow up as well as medication refills, we recommend that you follow up with your primary care physician  A registered nurse will reach out to you by phone within a few days after your discharge to answer any additional questions that you may have after going home  However, at this time we provide for you here, the most important instructions / recommendations at discharge:     Notable Medication Adjustments -   You were started on Xarelto, which is a blood thinner  This was approved through the pharmacy to be approximately $30 per month versus $60 for a 90-day supply if you are eligible for mail order  On this medication it is important that you avoid any Advils, Motrin or aspirin  Testing Required after Discharge -   None  Important follow up information -           You received a IVC filter to prevent further propagation of your clot in addition to the blood thinner  This IVC filter is removable and needs to come out in approximately 3 months  Follow-up you can follow-up with the vascular surgeon Dr Ziggy Parikh who can guide this process  His information is in your follow-up and he does go to [facility] which I believe will be closer for you  Other Instructions -   We discussed how important it is for you to take your blood thinner as prescribed    For the first 24 days you will take it twice a day  You received a dose in the hospital and your next dose will be tonight before bed  You can then start a routine of taking the medication in the morning with your morning medications before breakfast and then after dinner  After 21 days he will move to a daily regimen of 20 mg  Please review this entire after visit summary as additional general instructions including medication list, appointments, activity, diet, any pertinent wound care, and other additional recommendations from your care team that may be provided for you        Sincerely,     Saravanan Gross MD

## 2023-03-05 NOTE — ASSESSMENT & PLAN NOTE
Patient presented 1 day history of right lower extremity pain and swelling  Ultrasound revealed acute occlusive deep vein thrombosis in the multiple veins  He denied any recent surgery or prolonged car travel or ill mobility however he did have an ankle sprain in January  He does have a history of plaque psoriasis and recently started on Skyrizi and a history of hemochromatosis  · Hematology consulted and recommended IVC filter as well as anticoagulation  Later in the afternoon yesterday vascular surgery had noted the indication was more of a soft indication to place the IVC filter and that it was not officially recommended  The hematologist over the weekend on-call agreed  The Friday consultant discussed with the patient and wife at bedside her concerns and conveyed these in her signed note of the need for a 3-month reversible filter  I then discussed this with the vascular surgeon who agreed to this as well as the patient requiring close follow-up  I discussed this with the patient this morning, the fact that the indications are not set in stone for the procedure and the mixed opinions  He felt confident and comfortable with the discussion he had on Friday to proceed with IVC filter and understood the need to remove the filter in 3 months  I did convey to him leaving the filter in can cause further thrombosis and clotting and thus removing it is important  Though he will be officially consented by the interventional team, we did talk about the inherent risks and any procedure that is entering the blood vessels  · Discussed with interventional radiology who will perform the procedure midmorning  · Patient is n p o  after midnight  · Xarelto price check today is $30 per month or $60 for 90-day mail-in prescription  The first 30 days is free  I discussed this with the patient and he is agreeable  I will start his Xarelto just after the procedure and I did discuss this with the nurse    His wife plans to pick it up at our pharmacy before 1 PM

## 2023-03-05 NOTE — QUICK NOTE
I reviewed his chart  Priti Argueta is a 71-year-old who was hospitalized yesterday with right distal lower extremity occlusive DVT  He is currently on heparin drip  Hematology was consulted on Friday  In the hematology consultation note, IVC filter was recommended  This has not been endorsed by the attending physician on Friday  Vascular surgery was also consulted who does not think IVC filter is indicated  I agree with vascular surgery's recommendation of not placing IVF filter  He may continue with anticoagulation  I discussed his case extensively with internal medicine attending physician, Dr Patricia Fitch

## 2023-03-05 NOTE — ASSESSMENT & PLAN NOTE
Patient has a history of Heavy alcohol use and is on naltrexone 50 mg daily      He is maintained on a CIWA protocol continue multivitamin as an outpatient  We discussed the importance of compliance of the blood thinner as well as the 3-month follow-up to remove the IVC filter

## 2023-03-05 NOTE — DISCHARGE SUMMARY
[facility]  Discharge- [facility] 1970, 47 y o  male MRN: [id number]  Unit/Bed#: W -01 Encounter: [id number]  Primary Care Provider: Elif Shields MD   Date and time admitted to hospital: 3/3/2023  7:49 AM    * Lower leg DVT (deep venous thromboembolism), acute, right [facility])  Assessment & Plan  Patient presented 1 day history of right lower extremity pain and swelling  Ultrasound revealed acute occlusive deep vein thrombosis in the multiple veins  He denied any recent surgery or prolonged car travel or ill mobility however he did have an ankle sprain in January  He does have a history of plaque psoriasis and recently started on Skyrizi and a history of hemochromatosis  · Hematology consulted and recommended IVC filter as well as anticoagulation  Later in the afternoon yesterday vascular surgery had noted the indication was more of a soft indication to place the IVC filter and that it was not officially recommended  The hematologist over the weekend on-call agreed  The Friday consultant discussed with the patient and wife at bedside her concerns and conveyed these in her signed note of the need for a 3-month reversible filter  I then discussed this with the vascular surgeon who agreed to this as well as the patient requiring close follow-up  I discussed this with the patient this morning, the fact that the indications are not set in stone for the procedure and the mixed opinions  He felt confident and comfortable with the discussion he had on Friday to proceed with IVC filter and understood the need to remove the filter in 3 months  I did convey to him leaving the filter in can cause further thrombosis and clotting and thus removing it is important  Though he will be officially consented by the interventional team, we did talk about the inherent risks and any procedure that is entering the blood vessels    · Discussed with interventional radiology who performed the procedure today  · Xarelto price check today is $30 per month or $60 for 90-day mail-in prescription  The first 30 days is free  I discussed this with the patient and he is agreeable  I will start his Xarelto just after the procedure and I did discuss this with the nurse  His wife plans to pick it up at our pharmacy before 1 PM      Hereditary hemochromatosis [facility])  Assessment & Plan  Will require hematology follow-up  Tobacco abuse  Assessment & Plan  Reports smoking 3 packs a day  Continue nicotine replacement therapy while in the hospital   He did not appear in the contemplation stage of quitting and plans to resume smoking on discharge    Alcohol abuse  Assessment & Plan  Patient has a history of Heavy alcohol use and is on naltrexone 50 mg daily  He is maintained on a CIWA protocol continue multivitamin as an outpatient  We discussed the importance of compliance of the blood thinner as well as the 3-month follow-up to remove the IVC filter    Plaque psoriasis  Assessment & Plan  Maintained on Skyrizi         Medical Problems     Resolved Problems  Date Reviewed: 9/24/2019          Resolved    Acute deep vein thrombosis (DVT) of femoral vein of right lower extremity ([address] ) 3/3/2023     Resolved by  Phoenix Avery MD        Discharging Physician / Practitioner: Merlin Stanford MD  PCP: Donte Sheth MD  Admission Date:   Admission Orders (From admission, onward)     Ordered        03/03/23 1227  Place in Observation  Once                      Discharge Date: 03/05/23    Consultations During Hospital Stay:  · Hematology  · Vascular surgery  · Interventional radiology    Procedures Performed:   · IVC filter placed on March 5    Significant Findings / Test Results:   · Large DVT on Doppler ultrasound in the right lower extremity, free-floating clot    Incidental Findings:   · None      Test Results Pending at Discharge (will require follow up):    None    Outpatient Tests Requested:  · Close follow-up with vascular surgery and interventional radiology to remove the filter in 3 months  · Follow-up with hematology for blood clotting and hemochromatosis    Complications: None    Reason for Admission: Right lower extremity pain    Hospital Course:   Ciera Rodriguez is a 47 y o  male patient who originally presented to the hospital on 3/3/2023 due to right lower extremity pain and swelling  He was found to have a deep vein thrombosis with a free-floating clot  Vascular surgery and hematology were consulted  Hematology recommended placement of an IVC filter and vascular surgery felt it would be okay without this procedure however agreed that a 3-month removed IVC filter was okay  The patient in full discussion about the different recommendations asked to pursue the IVC filter  Interventional radiology consented the patient and the filter was placed on March 5  The plan is to remove this filter in 3 months  Please see above list of diagnoses and related plan for additional information  Condition at Discharge: fair    Discharge Day Visit / Exam:   * Please refer to separate progress note for these details *    Discussion with Family: Patient declined call to   Discharge instructions/Information to patient and family:   See after visit summary for information provided to patient and family  Provisions for Follow-Up Care:  See after visit summary for information related to follow-up care and any pertinent home health orders  Disposition:   Home    Planned Readmission: no     Discharge Statement:  I spent 55 minutes discharging the patient  This time was spent on the day of discharge  I had direct contact with the patient on the day of discharge  Greater than 50% of the total time was spent examining patient, answering all patient questions, arranging and discussing plan of care with patient as well as directly providing post-discharge instructions    Additional time then spent on discharge activities  Discharge Medications:  See after visit summary for reconciled discharge medications provided to patient and/or family        **Please Note: This note may have been constructed using a voice recognition system**

## 2023-03-05 NOTE — PLAN OF CARE
Problem: PAIN - ADULT  Goal: Verbalizes/displays adequate comfort level or baseline comfort level  Description: Interventions:  - Encourage patient to monitor pain and request assistance  - Assess pain using appropriate pain scale  - Administer analgesics based on type and severity of pain and evaluate response  - Implement non-pharmacological measures as appropriate and evaluate response  - Consider cultural and social influences on pain and pain management  - Notify physician/advanced practitioner if interventions unsuccessful or patient reports new pain  Outcome: Progressing     Problem: INFECTION - ADULT  Goal: Absence or prevention of progression during hospitalization  Description: INTERVENTIONS:  - Assess and monitor for signs and symptoms of infection  - Monitor lab/diagnostic results  - Monitor all insertion sites, i e  indwelling lines, tubes, and drains  - Monitor endotracheal if appropriate and nasal secretions for changes in amount and color  - [place] appropriate cooling/warming therapies per order  - Administer medications as ordered  - Instruct and encourage patient and family to use good hand hygiene technique  - Identify and instruct in appropriate isolation precautions for identified infection/condition  Outcome: Progressing  Goal: Absence of fever/infection during neutropenic period  Description: INTERVENTIONS:  - Monitor WBC    Outcome: Progressing     Problem: SAFETY ADULT  Goal: Patient will remain free of falls  Description: INTERVENTIONS:  - Educate patient/family on patient safety including physical limitations  - Instruct patient to call for assistance with activity   - Consult OT/PT to assist with strengthening/mobility   - Keep Call bell within reach  - Keep bed low and locked with side rails adjusted as appropriate  - Keep care items and personal belongings within reach  - Initiate and maintain comfort rounds  - Make Fall Risk Sign visible to staff  - Offer Toileting every  Hours, in advance of need  - Initiate/Maintain alarm  - Obtain necessary fall risk management equipment:   - Apply yellow socks and bracelet for high fall risk patients  - Consider moving patient to room near nurses station  Outcome: Progressing  Goal: Maintain or return to baseline ADL function  Description: INTERVENTIONS:  -  Assess patient's ability to carry out ADLs; assess patient's baseline for ADL function and identify physical deficits which impact ability to perform ADLs (bathing, care of mouth/teeth, toileting, grooming, dressing, etc )  - Assess/evaluate cause of self-care deficits   - Assess range of motion  - Assess patient's mobility; develop plan if impaired  - Assess patient's need for assistive devices and provide as appropriate  - Encourage maximum independence but intervene and supervise when necessary  - Involve family in performance of ADLs  - Assess for home care needs following discharge   - Consider OT consult to assist with ADL evaluation and planning for discharge  - Provide patient education as appropriate  Outcome: Progressing  Goal: Maintains/Returns to pre admission functional level  Description: INTERVENTIONS:  - Perform BMAT or MOVE assessment daily    - Set and communicate daily mobility goal to care team and patient/family/caregiver  - Collaborate with rehabilitation services on mobility goals if consulted  - Perform Range of Motion 3 times a day  - Reposition patient every 3 hours    - Dangle patient 3 times a day  - Stand patient 3 times a day  - Ambulate patient 3 times a day  - Out of bed to chair 6 times a day   - Out of bed for meals 6 times a day  - Out of bed for toileting  - Record patient progress and toleration of activity level   Outcome: Progressing

## 2023-03-05 NOTE — DISCHARGE INSTRUCTIONS
Inferior Vena Cava Filter Placement     WHAT YOU NEED TO KNOW:   Inferior vena cava filter placement is surgery to place a filter into your inferior vena cava (IVC)  The IVC is a large blood vessel that brings blood from your lower body back to your heart  The filter is a small mesh strainer made of thin wires  It is placed in the center of the IVC to trap blood clots going to your heart or lungs  Filter types: Permanent Filters are used for patients who can't have anticoagulation medications  The filters are left in place permanently  Optional filters: Are filters that can be removed when a patient's risk for clotting is decreased  DISCHARGE INSTRUCTIONS:     Wound care: Keep your wound clean and dry  Band aid may come off in 24 hours  Self-care:   Limit activity: Do not lift, pull or push heavy objects for 24 hours  Slowly start to do more each day  Return to your daily activities as directed  Resume your normal diet  Small sips of flat soda will help with nausea  Contact Interventional Radiology at [phone] [place] PATIENTS: Contact Interventional Radiology at [phone]) Delonte Falcon PATIENTS: Contact Interventional Radiology at [phone]) if:  You have a fever  You have chills, a cough, or feel weak and achy  Persistent nausea or vomiting  Your wound is red, swollen, or draining pus  You have questions or concerns about your condition  Optional filters can and should  be removed when you no longer need it  Please call Interventional Radiology to make your removal appointment  Moderate Sedation   WHAT YOU NEED TO KNOW:   Moderate sedation, or conscious sedation, is medicine used during procedures to help you feel relaxed and calm  You will be awake and able to follow directions without anxiety or pain  You will remember little to none of the procedure  You may feel tired, weak, or unsteady on your feet after you get sedation   You may also have trouble concentrating or short-term memory loss  These symptoms should go away in 24 hours or less  DISCHARGE INSTRUCTIONS:   Call 911 or have someone else call for any of the following: You have sudden trouble breathing  You cannot be woken  Seek care immediately if:   You have a severe headache or dizziness  Your heart is beating faster than usual   Contact your healthcare provider if:   You have a fever  You have nausea or are vomiting for more than 8 hours after the procedure  Your skin is itchy, swollen, or you have a rash  You have questions or concerns about your condition or care  Self-care:   Have someone stay with you for 24 hours  This person can drive you to errands and help you do things around the house  This person can also watch for problems  Rest and do quiet activities for 24 hours  Do not exercise, ride a bike, or play sports  Stand up slowly to prevent dizziness and falls  Take short walks around the house with another person  Slowly return to your usual activities the next day  Do not drive or use dangerous machines or tools for 24 hours  You may injure yourself or others  Examples include a lawnmower, saw, or drill  Do not return to work for 24 hours if you use dangerous machines or tools for work  Do not make important decisions for 24 hours  For example, do not sign important papers or invest money  Drink liquids as directed  Liquids help flush the sedation medicine out of your body  Ask how much liquid to drink each day and which liquids are best for you  Eat small, frequent meals to prevent nausea and vomiting  Start with clear liquids such as juice or broth  If you do not vomit after clear liquids, you can eat your usual foods  Do not drink alcohol or take medicines that make you drowsy  This includes medicines that help you sleep and anxiety medicines  Ask your healthcare provider if it is safe for you to take pain medicine    Follow up with your healthcare provider as directed: Write down your questions so you remember to ask them during your visits  © 2017 [employer] Information is for End User's use only and may not be sold, redistributed or otherwise used for commercial purposes  All illustrations and images included in CareNotes® are the copyrighted property of [employer] M [employer]  or Travis Purcell  The above information is an  only  It is not intended as medical advice for individual conditions or treatments  Talk to your doctor, nurse or pharmacist before following any medical regimen to see if it is safe and effective for you

## 2023-03-05 NOTE — PROGRESS NOTES
[facility]  Progress Note - Kvng Guido 1970, 47 y o  male MRN: [id number]  Unit/Bed#: W -01 Encounter: [id number]  Primary Care Provider: Jena Galicia MD   Date and time admitted to hospital: 3/3/2023  7:49 AM    * Lower leg DVT (deep venous thromboembolism), acute, right [facility])  Assessment & Plan  50-year-old male with PMH alcohol abuse, tobacco abuse and Right lower DVT  Pt states he does re-call a recent trauma to his right leg, fell from a tree stand and sprained his R ankle in January  Pt received intervention for IVC filter today with no issues  States he is being discharged today  Pt states he understands IVC filter must be removed in 3 months and is verbalized understanding and importance of taking anticoagulation medication  -3/3/2023 LEV demonstrates acute occlusive DVT in the distal femoral,  popliteal, PT, AT, peroneal, and gastrocnemius veins  Also noted is a  free-floating, non-occlusive acute deep vein thrombosis noted in the mid femoral vein  Plan  Continue with anticoagulation, D/C home today  Pt received intervention for IVC filter today with no issues will f/u in 3 months for IVC removal  Encouraged to continue with leg elevation  No activity restriction  Educated on Compression socks for DVT to comfort  "gradient" compression that goes up to the thigh could benefit him in the 15-20 mmHg strength range and can increase compression strength if needed  D/W Dr Uriel Lopez:  /66   Pulse 70   Temp 98 6 °F (37 °C)   Resp 20   Wt 102 kg (225 lb)   SpO2 95%   BMI 27 39 kg/m²     I/Os:  I/O last 3 completed shifts: In: 720 5 [P O :240; I V :480 5]  Out: -   I/O this shift:  In: 240 [P O :240]  Out: -     Subjective:   Reviewed with pt leg elevation, compression and warm compression for R leg pain  Pt states he is being discharged and verbalized understanding of IVC filter removal and medication regime      Lab Results and Cultures:   CBC with diff: Lab Results   Component Value Date    WBC 7 94 03/05/2023    HGB 16 0 03/05/2023    HCT 47 3 03/05/2023    MCV 99 (H) 03/05/2023     (L) 03/05/2023    MCH 33 5 03/05/2023    MCHC 33 8 03/05/2023    RDW 13 1 03/05/2023    MPV 10 0 03/05/2023    NRBC 0 03/05/2023   ,   BMP/CMP:  Lab Results   Component Value Date    SODIUM 135 03/05/2023    K 4 0 03/05/2023     03/05/2023    CO2 25 03/05/2023    BUN 9 03/05/2023    CREATININE 0 85 03/05/2023    CALCIUM 8 7 03/05/2023    AST 13 03/04/2023    ALT 11 03/04/2023    ALKPHOS 65 03/04/2023    EGFR 100 03/05/2023   ,   Lipid Panel: No results found for: CHOL,   Coags:   Lab Results   Component Value Date    PTT 67 (H) 03/05/2023    INR 1 00 03/03/2023   ,     Blood Culture: No results found for: BLOODCX,   Urinalysis: Lab Results   Component Value Date    COLORU Yellow 01/21/2023    CLARITYU Clear 01/21/2023    SPECGRAV 1 028 01/21/2023    PHUR 5 5 01/21/2023    LEUKOCYTESUR (A) 01/21/2023     Elevated glucose may cause decreased leukocyte values   See urine microscopic for [facility] result/    NITRITE Negative 01/21/2023    GLUCOSEU >=1000 (1%) (A) 01/21/2023    KETONESU Trace (A) 01/21/2023    BILIRUBINUR Negative 01/21/2023    BLOODU Negative 01/21/2023   ,   Urine Culture: No results found for: URINECX,   Wound Culure: No results found for: WOUNDCULT    Medications:  Current Facility-Administered Medications   Medication Dose Route Frequency   • acetaminophen (TYLENOL) tablet 650 mg  650 mg Oral R2V PRN   • folic acid (FOLVITE) tablet 1 mg  1 mg Oral Daily   • multivitamin-minerals (CENTRUM) tablet 1 tablet  1 tablet Oral Daily   • naltrexone (REVIA) tablet 50 mg  50 mg Oral Daily   • nicotine (NICODERM CQ) 21 mg/24 hr TD 24 hr patch 2 patch  2 patch Transdermal Daily   • nicotine polacrilex (NICORETTE) gum 2 mg  2 mg Oral Q2H PRN   • rivaroxaban (XARELTO) tablet 15 mg  15 mg Oral BID With Meals   • thiamine tablet 100 mg  100 mg Oral Daily Physical Exam:    General: Alert and Oriented x3  Respiratory: No respiratory distress  Extremities: Pt states R leg still has minor pain          Wound/Incision:  R neck puncture site from IVC filter placement covered, clean and dry       CHRISSIE Robles  3/5/2023

## 2023-03-05 NOTE — ASSESSMENT & PLAN NOTE
50-year-old male with PMH alcohol abuse, tobacco abuse and Right lower DVT  Pt states he does re-call a recent trauma to his right leg, fell from a tree stand and sprained his R ankle in January  Pt received intervention for IVC filter today with no issues  States he is being discharged today  Pt states he understands IVC filter must be removed in 3 months and is verbalized understanding and importance of taking anticoagulation medication  -3/3/2023 LEV demonstrates acute occlusive DVT in the distal femoral,  popliteal, PT, AT, peroneal, and gastrocnemius veins  Also noted is a  free-floating, non-occlusive acute deep vein thrombosis noted in the mid femoral vein  Plan  Continue with anticoagulation, D/C home today  Pt received intervention for IVC filter today with no issues will f/u in 3 months for IVC removal  Encouraged to continue with leg elevation  No activity restriction  Educated on Compression socks for DVT to comfort  "gradient" compression that goes up to the thigh could benefit him in the 15-20 mmHg strength range and can increase compression strength if needed     D/W Dr Rebekah Jacobo

## 2023-03-05 NOTE — SEDATION DOCUMENTATION
IVC filter placed by Dr Molly Nick  Patient tolerated well and vital signs stable throughout procedure  Steri strips in place   IR Procedure Bedrest Start Time is 1045  [facility]

## 2023-03-05 NOTE — ASSESSMENT & PLAN NOTE
Patient has a history of Heavy alcohol use and is on naltrexone 50 mg daily  He is maintained on a CIWA protocol continue thiamine and folate     We discussed the importance of compliance of the blood thinner as well as the 3-month follow-up to remove the IVC filter

## 2023-03-05 NOTE — PROGRESS NOTES
[facility]  Progress Note - Lucy Kim 1970, 47 y o  male MRN: [id number]  Unit/Bed#: W -01 Encounter: [id number]  Primary Care Provider: Maxime Gonzales MD   Date and time admitted to hospital: 3/3/2023  7:49 AM    * Lower leg DVT (deep venous thromboembolism), acute, right [facility])  Assessment & Plan  Patient presented 1 day history of right lower extremity pain and swelling  Ultrasound revealed acute occlusive deep vein thrombosis in the multiple veins  He denied any recent surgery or prolonged car travel or ill mobility however he did have an ankle sprain in January  He does have a history of plaque psoriasis and recently started on Skyrizi and a history of hemochromatosis  · Hematology consulted and recommended IVC filter as well as anticoagulation  Later in the afternoon yesterday vascular surgery had noted the indication was more of a soft indication to place the IVC filter and that it was not officially recommended  The hematologist over the weekend on-call agreed  The Friday consultant discussed with the patient and wife at bedside her concerns and conveyed these in her signed note of the need for a 3-month reversible filter  I then discussed this with the vascular surgeon who agreed to this as well as the patient requiring close follow-up  I discussed this with the patient this morning, the fact that the indications are not set in stone for the procedure and the mixed opinions  He felt confident and comfortable with the discussion he had on Friday to proceed with IVC filter and understood the need to remove the filter in 3 months  I did convey to him leaving the filter in can cause further thrombosis and clotting and thus removing it is important  Though he will be officially consented by the interventional team, we did talk about the inherent risks and any procedure that is entering the blood vessels    · Discussed with interventional radiology who will perform the procedure midmorning  · Patient is n p o  after midnight  · Xarelto price check today is $30 per month or $60 for 90-day mail-in prescription  The first 30 days is free  I discussed this with the patient and he is agreeable  I will start his Xarelto just after the procedure and I did discuss this with the nurse  His wife plans to pick it up at our pharmacy before 1 PM      Hereditary hemochromatosis [facility])  Assessment & Plan  Will require hematology follow-up  Tobacco abuse  Assessment & Plan  Reports smoking 3 packs a day  Continue nicotine replacement therapy     Alcohol abuse  Assessment & Plan  Patient has a history of Heavy alcohol use and is on naltrexone 50 mg daily  He is maintained on a CIWA protocol continue thiamine and folate  We discussed the importance of compliance of the blood thinner as well as the 3-month follow-up to remove the IVC filter    Plaque psoriasis  Assessment & Plan  Maintained on Skyrizi       VTE Pharmacologic Prophylaxis: VTE Score: 8 High Risk (Score >/= 5) - Pharmacological DVT Prophylaxis Ordered: heparin drip  Sequential Compression Devices Ordered  Patient Centered Rounds: I performed bedside rounds with nursing staff today  Discussions with Specialists or Other Care Team Provider: Hematology, interventional radiology, vascular surgery    Education and Discussions with Family / Patient: Patient declined call to   Total Time Spent on Date of Encounter in care of patient: 35 minutes This time was spent on one or more of the following: performing physical exam; counseling and coordination of care; obtaining or reviewing history; documenting in the medical record; reviewing/ordering tests, medications or procedures; communicating with other healthcare professionals and discussing with patient's family/caregivers      Current Length of Stay: 0 day(s)  Current Patient Status: Observation   Certification Statement: The patient will continue to require additional inpatient hospital stay due to Placement of IVC filter  Discharge Plan: Anticipate discharge later today or tomorrow to home  Code Status: Level 1 - Full Code    Subjective: The patient notes that his sleep was broken last night with various blood draws and vitals  We have a long discussion about the procedure as above  Understand the mixed opinions of placement of the filter however feels comfortable and confident with the discussion he and his wife had on Friday with Dr Zaida Day and her team   He reflects on concern over propagation of the clot discussed Friday and I did reiterate to him the mixed evidence suggesting it is very possible the clot could stay in place throughout his entire treatment  He would like to proceed with the procedure and interventional radiology will consent him- "feeling more safe than sorry" with the decision to take the extra step of protection  We talked about how important his compliance is with a blood thinner and he describes to me his process of taking medication and affirms compliance  We also discussed how important it is to follow-up within 3 months to have the filter removed and he again understands and conveys this to me  He denies any chest pain this morning  He denies any right lower extremity pain  He is looking forward to having the heparin drip discontinued  Objective:     Vitals:   Temp (24hrs), Av 3 °F (36 8 °C), Min:98 °F (36 7 °C), Max:98 6 °F (37 °C)    Temp:  [98 °F (36 7 °C)-98 6 °F (37 °C)] 98 6 °F (37 °C)  HR:  [66-73] 68  Resp:  [17-19] 19  BP: (119-126)/(80-82) 119/81  SpO2:  [96 %-97 %] 96 %  Body mass index is 27 39 kg/m²  Input and Output Summary (last 24 hours): Intake/Output Summary (Last 24 hours) at 3/5/2023 0925  Last data filed at 3/5/2023 0744  Gross per 24 hour   Intake 480 53 ml   Output --   Net 480 53 ml       Physical Exam:   Physical Exam  Vitals and nursing note reviewed     Constitutional: Appearance: Normal appearance  He is not ill-appearing or diaphoretic  HENT:      Head: Normocephalic  Nose: Nose normal  No congestion  Mouth/Throat:      Mouth: Mucous membranes are moist       Pharynx: No oropharyngeal exudate  Eyes:      General: No scleral icterus  Conjunctiva/sclera: Conjunctivae normal    Abdominal:      General: Bowel sounds are normal  There is no distension  Palpations: Abdomen is soft  Tenderness: There is no abdominal tenderness  Musculoskeletal:      Cervical back: Neck supple  Right lower leg: Edema present  Left lower leg: No edema  Skin:     General: Skin is warm  Coloration: Skin is not jaundiced  Neurological:      Mental Status: He is alert and oriented to person, place, and time  Psychiatric:         Mood and Affect: Mood normal          Behavior: Behavior normal           Additional Data:     Labs:  Results from last 7 days   Lab Units 03/05/23  0550   WBC Thousand/uL 7 94   HEMOGLOBIN g/dL 16 0   HEMATOCRIT % 47 3   PLATELETS Thousands/uL 147*   NEUTROS PCT % 57   LYMPHS PCT % 31   MONOS PCT % 7   EOS PCT % 4     Results from last 7 days   Lab Units 03/05/23  0550 03/04/23  0846   SODIUM mmol/L 135 137   POTASSIUM mmol/L 4 0 4 2   CHLORIDE mmol/L 103 104   CO2 mmol/L 25 28   BUN mg/dL 9 8   CREATININE mg/dL 0 85 0 81   ANION GAP mmol/L 7 5   CALCIUM mg/dL 8 7 8 8   ALBUMIN g/dL  --  3 5   TOTAL BILIRUBIN mg/dL  --  0 56   ALK PHOS U/L  --  65   ALT U/L  --  11   AST U/L  --  13   GLUCOSE RANDOM mg/dL 183* 164*     Results from last 7 days   Lab Units 03/03/23  1029   INR  1 00                   Lines/Drains:  Invasive Devices     Peripheral Intravenous Line  Duration           Peripheral IV 03/03/23 Right Antecubital 2 days                      Imaging: No pertinent imaging reviewed      Recent Cultures (last 7 days):         Last 24 Hours Medication List:   Current Facility-Administered Medications   Medication Dose Route Frequency Provider Last Rate   • acetaminophen  650 mg Oral Q4H PRN Nyasia Noel MD     • folic acid  1 mg Oral Daily Nyasia Noel MD     • heparin (porcine)  3-30 Units/kg/hr (Order-Specific) Intravenous Titrated Nyasia Noel MD 18 Units/kg/hr (03/05/23 0809)   • heparin (porcine)  4,000 Units Intravenous Q6H PRN Nyasia Noel MD     • heparin (porcine)  8,000 Units Intravenous Q6H PRN Nyasia Nole MD     • multivitamin-minerals  1 tablet Oral Daily Nyasia Noel MD     • naltrexone  50 mg Oral Daily Nyasia Noel MD     • nicotine  2 patch Transdermal Daily Nyasia Noel MD     • nicotine polacrilex  2 mg Oral Q2H PRN Winsome Browning PA-C     • thiamine  100 mg Oral Daily Nyasia Noel MD          Today, Patient Was Seen By: Yash Sprague MD    **Please Note: This note may have been constructed using a voice recognition system  **

## 2023-03-06 LAB — PROT S ACT/NOR PPP: 102 % (ref 71–117)

## 2023-03-07 ENCOUNTER — TELEPHONE (OUTPATIENT)
Dept: HEMATOLOGY ONCOLOGY | Facility: CLINIC | Age: 53
End: 2023-03-07

## 2023-03-07 LAB
B2 GLYCOPROT1 IGA SERPL IA-ACNC: 2.4
B2 GLYCOPROT1 IGG SERPL IA-ACNC: <0.8
B2 GLYCOPROT1 IGM SERPL IA-ACNC: <2.4
CARDIOLIPIN IGA SER IA-ACNC: 5.6
CARDIOLIPIN IGG SER IA-ACNC: <0.5
CARDIOLIPIN IGM SER IA-ACNC: 2.9

## 2023-03-07 NOTE — TELEPHONE ENCOUNTER
Per my conversation with Dr Archana Kirby, I phoned the patient and left a VM indicating that I was calling from [facility] regarding setting up a follow up appointment based on his recent admission to the [facility]  I provided my direct phone number, as well as the [facility] number, as a contact and asked the patient to return my call to schedule

## 2023-03-08 LAB — F5 GENE MUT ANL BLD/T: NORMAL

## 2023-03-10 LAB — F2 GENE MUT ANL BLD/T: NORMAL

## 2023-05-17 ENCOUNTER — TELEPHONE (OUTPATIENT)
Dept: RADIOLOGY | Facility: HOSPITAL | Age: 53
End: 2023-05-17

## 2023-05-17 NOTE — TELEPHONE ENCOUNTER
IR Clinic Follow-Up:    Patient clinical visit in 1 month      Schedule visit for the first available IR Physician: No                *If no, schedule for:   IR Physician: Luther Gibbs    Type of Visit: Virtual Brief Visit - Telephone    Additional Details: free floating fem thrombus, on xarelto

## 2023-05-26 ENCOUNTER — TELEMEDICINE (OUTPATIENT)
Dept: INTERVENTIONAL RADIOLOGY/VASCULAR | Facility: CLINIC | Age: 53
End: 2023-05-26

## 2023-05-26 DIAGNOSIS — I82.4Z1 LOWER LEG DVT (DEEP VENOUS THROMBOEMBOLISM), ACUTE, RIGHT (HCC): Primary | ICD-10-CM

## 2023-05-26 NOTE — PROGRESS NOTES
Interventional Radiology  Consultation 5/26/2023     Consults  Sherry Villanueva   1970   [id number]      Assessment/Plan:  47year old male admitted with leg swelling  Found to have right LE DVT  On anticoagulation currently and tolerating  IVC filter placed for free floating thrombus  Patient on anticoagulation and appropriate to take filter out  Plan for IVC filter removal  The risks and benefits of the procedure were discussed with the patient and he wishes to proceed with removal      Medical Problems     Problem List     Plaque psoriasis    Alcohol abuse    Lower leg DVT (deep venous thromboembolism), acute, right (HCC)    Tobacco abuse    Hereditary hemochromatosis ([facility] [address] )            Subjective:     Patient ID: Sherry Villanueva is a 47 y o  male  History of Present Illness  47year old male admitted with leg swelling  Found to have right LE DVT  On anticoagulation currently and tolerating  IVC filter placed for free floating thrombus  Patient on anticoagulation and appropriate to take filter out   Plan for IVC filter removal  The risks and benefits of the procedure were discussed with the patient and he wishes to proceed with removal      Review of Systems  as above    Past Medical History:   Diagnosis Date   • Ear problems    • HL (hearing loss)    • Psoriasis         Past Surgical History:   Procedure Laterality Date   • CHOLECYSTECTOMY     • GASTRIC BYPASS     • IR IVC FILTER PLACEMENT OPTIONAL/TEMPORARY  3/5/2023        Social History     Tobacco Use   Smoking Status Every Day   • Packs/day: 1 00   • Types: Cigarettes   Smokeless Tobacco Never        Social History     Substance and Sexual Activity   Alcohol Use Yes   • Alcohol/week: 21 0 standard drinks of alcohol   • Types: 21 Cans of beer per week    Comment: average 3 a  day        Social History     Substance and Sexual Activity   Drug Use No        Allergies   Allergen Reactions   • Codeine        Current Outpatient Medications   Medication Sig Dispense "Refill   • adalimumab (HUMIRA) 40 mg/0 8 mL PSKT Inject 1 Dose under the skin every 14 (fourteen) days     • Empagliflozin-metFORMIN HCl ER (Synjardy XR) 5-1000 MG TB24 Synjardy XR 5 mg-1,000 mg tablet, extended release     • erythromycin (ILOTYCIN) ophthalmic ointment Place a 1/2 inch ribbon of ointment into the lower eyelid 4 times daily for 5 days 3 5 g 0   • Multiple Vitamin (multivitamin) capsule Take 1 capsule by mouth daily  0   • rivaroxaban (Xarelto Starter Pack) 15 & 20 MG starter pack Take 1 tablet by mouth 2 (two) times a day Take 15mg tablet twice daily x 21 days followed by 20mg daily afterwards 51 each 0   • rivaroxaban (Xarelto) 20 mg tablet Take 1 tablet (20 mg total) by mouth daily with breakfast 30 tablet 5     No current facility-administered medications for this visit  Objective: There were no vitals filed for this visit  Physical Exam  Not performed    No results found for: \"BNP\"   Lab Results   Component Value Date    HCT 47 3 03/05/2023    HGB 16 0 03/05/2023    MCV 99 (H) 03/05/2023     (L) 03/05/2023    WBC 7 94 03/05/2023     Lab Results   Component Value Date    INR 1 00 03/03/2023    PROTIME 13 5 03/03/2023     Lab Results   Component Value Date    PTT 67 (H) 03/05/2023         I have personally reviewed pertinent imaging and laboratory results  Code Status: [unfilled]  Advance Directive and Living Will:      Power of :    POLST:      IR has been consulted to evaluate the patient, determine the appropriate procedure, and whether or not a procedure can and should be performed  Thank you for allowing me to participate in the care of [place]  Please don't hesitate to call, text, email, or TigerText with any questions  This text is generated with voice recognition software  There may be translation, syntax,  or grammatical errors  If you have any questions, please contact the dictating provider         "

## 2023-05-26 NOTE — H&P (VIEW-ONLY)
Interventional Radiology  Consultation 5/26/2023     Consults  Adolph Castaneda   1970   [id number]      Assessment/Plan:  47year old male admitted with leg swelling  Found to have right LE DVT  On anticoagulation currently and tolerating  IVC filter placed for free floating thrombus  Patient on anticoagulation and appropriate to take filter out  Plan for IVC filter removal  The risks and benefits of the procedure were discussed with the patient and he wishes to proceed with removal      Medical Problems     Problem List     Plaque psoriasis    Alcohol abuse    Lower leg DVT (deep venous thromboembolism), acute, right (HCC)    Tobacco abuse    Hereditary hemochromatosis ([address] )            Subjective:     Patient ID: Adolph Castaneda is a 47 y o  male  History of Present Illness  47year old male admitted with leg swelling  Found to have right LE DVT  On anticoagulation currently and tolerating  IVC filter placed for free floating thrombus  Patient on anticoagulation and appropriate to take filter out   Plan for IVC filter removal  The risks and benefits of the procedure were discussed with the patient and he wishes to proceed with removal      Review of Systems  as above    Past Medical History:   Diagnosis Date   • Ear problems    • HL (hearing loss)    • Psoriasis         Past Surgical History:   Procedure Laterality Date   • CHOLECYSTECTOMY     • GASTRIC BYPASS     • IR IVC FILTER PLACEMENT OPTIONAL/TEMPORARY  3/5/2023        Social History     Tobacco Use   Smoking Status Every Day   • Packs/day: 1 00   • Types: Cigarettes   Smokeless Tobacco Never        Social History     Substance and Sexual Activity   Alcohol Use Yes   • Alcohol/week: 21 0 standard drinks of alcohol   • Types: 21 Cans of beer per week    Comment: average 3 a  day        Social History     Substance and Sexual Activity   Drug Use No        Allergies   Allergen Reactions   • Codeine        Current Outpatient Medications   Medication Sig Dispense "Refill   • adalimumab (HUMIRA) 40 mg/0 8 mL PSKT Inject 1 Dose under the skin every 14 (fourteen) days     • Empagliflozin-metFORMIN HCl ER (Synjardy XR) 5-1000 MG TB24 Synjardy XR 5 mg-1,000 mg tablet, extended release     • erythromycin (ILOTYCIN) ophthalmic ointment Place a 1/2 inch ribbon of ointment into the lower eyelid 4 times daily for 5 days 3 5 g 0   • Multiple Vitamin (multivitamin) capsule Take 1 capsule by mouth daily  0   • rivaroxaban (Xarelto Starter Pack) 15 & 20 MG starter pack Take 1 tablet by mouth 2 (two) times a day Take 15mg tablet twice daily x 21 days followed by 20mg daily afterwards 51 each 0   • rivaroxaban (Xarelto) 20 mg tablet Take 1 tablet (20 mg total) by mouth daily with breakfast 30 tablet 5     No current facility-administered medications for this visit  Objective: There were no vitals filed for this visit  Physical Exam  Not performed    No results found for: \"BNP\"   Lab Results   Component Value Date    HCT 47 3 03/05/2023    HGB 16 0 03/05/2023    MCV 99 (H) 03/05/2023     (L) 03/05/2023    WBC 7 94 03/05/2023     Lab Results   Component Value Date    INR 1 00 03/03/2023    PROTIME 13 5 03/03/2023     Lab Results   Component Value Date    PTT 67 (H) 03/05/2023         I have personally reviewed pertinent imaging and laboratory results  Code Status: [unfilled]  Advance Directive and Living Will:      Power of :    POLST:      IR has been consulted to evaluate the patient, determine the appropriate procedure, and whether or not a procedure can and should be performed  Thank you for allowing me to participate in the care of Dhruv Ordonez  Please don't hesitate to call, text, email, or TigerText with any questions  This text is generated with voice recognition software  There may be translation, syntax,  or grammatical errors  If you have any questions, please contact the dictating provider         "

## 2023-06-16 ENCOUNTER — TELEPHONE (OUTPATIENT)
Dept: RADIOLOGY | Facility: HOSPITAL | Age: 53
End: 2023-06-16

## 2023-06-23 ENCOUNTER — HOSPITAL ENCOUNTER (OUTPATIENT)
Dept: RADIOLOGY | Facility: HOSPITAL | Age: 53
Discharge: HOME | End: 2023-06-23
Attending: RADIOLOGY
Payer: COMMERCIAL

## 2023-06-23 ENCOUNTER — TELEPHONE (OUTPATIENT)
Dept: RADIOLOGY | Facility: HOSPITAL | Age: 53
End: 2023-06-23

## 2023-06-23 VITALS
OXYGEN SATURATION: 99 % | RESPIRATION RATE: 14 BRPM | SYSTOLIC BLOOD PRESSURE: 142 MMHG | TEMPERATURE: 98 F | DIASTOLIC BLOOD PRESSURE: 79 MMHG | HEART RATE: 85 BPM

## 2023-06-23 DIAGNOSIS — I82.4Z1 LOWER LEG DVT (DEEP VENOUS THROMBOEMBOLISM), ACUTE, RIGHT (HCC): ICD-10-CM

## 2023-06-23 LAB
ERYTHROCYTE [DISTWIDTH] IN BLOOD BY AUTOMATED COUNT: 13.3 % (ref 11.6–15.1)
HCT VFR BLD AUTO: 47.4 % (ref 36.5–49.3)
HGB BLD-MCNC: 15.8 G/DL (ref 12–17)
INR PPP: 1.05 (ref 0.84–1.19)
MCH RBC QN AUTO: 33.8 PG (ref 26.8–34.3)
MCHC RBC AUTO-ENTMCNC: 33.3 G/DL (ref 31.4–37.4)
MCV RBC AUTO: 102 FL (ref 82–98)
PLATELET # BLD AUTO: 210 THOUSANDS/UL (ref 149–390)
PMV BLD AUTO: 10.1 FL (ref 8.9–12.7)
PROTHROMBIN TIME: 13.9 SECONDS (ref 11.6–14.5)
RBC # BLD AUTO: 4.67 MILLION/UL (ref 3.88–5.62)
WBC # BLD AUTO: 6.61 THOUSAND/UL (ref 4.31–10.16)

## 2023-06-23 PROCEDURE — 85610 PROTHROMBIN TIME: CPT | Performed by: RADIOLOGY

## 2023-06-23 PROCEDURE — C1894 INTRO/SHEATH, NON-LASER: HCPCS

## 2023-06-23 PROCEDURE — 99152 MOD SED SAME PHYS/QHP 5/>YRS: CPT | Performed by: RADIOLOGY

## 2023-06-23 PROCEDURE — 85027 COMPLETE CBC AUTOMATED: CPT | Performed by: RADIOLOGY

## 2023-06-23 PROCEDURE — 37193 REM ENDOVAS VENA CAVA FILTER: CPT | Performed by: RADIOLOGY

## 2023-06-23 PROCEDURE — C1769 GUIDE WIRE: HCPCS

## 2023-06-23 PROCEDURE — C1773 RET DEV, INSERTABLE: HCPCS

## 2023-06-23 PROCEDURE — 37193 REM ENDOVAS VENA CAVA FILTER: CPT

## 2023-06-23 RX ORDER — IODIXANOL 320 MG/ML
20 INJECTION, SOLUTION INTRAVASCULAR
Status: COMPLETED | OUTPATIENT
Start: 2023-06-23 | End: 2023-06-23

## 2023-06-23 RX ORDER — MIDAZOLAM HYDROCHLORIDE 2 MG/2ML
INJECTION, SOLUTION INTRAMUSCULAR; INTRAVENOUS AS NEEDED
Status: COMPLETED | OUTPATIENT
Start: 2023-06-23 | End: 2023-06-23

## 2023-06-23 RX ORDER — FENTANYL CITRATE 50 UG/ML
INJECTION, SOLUTION INTRAMUSCULAR; INTRAVENOUS AS NEEDED
Status: COMPLETED | OUTPATIENT
Start: 2023-06-23 | End: 2023-06-23

## 2023-06-23 RX ADMIN — IODIXANOL 20 ML: 320 INJECTION, SOLUTION INTRAVASCULAR at 11:26

## 2023-06-23 RX ADMIN — FENTANYL CITRATE 50 MCG: 50 INJECTION, SOLUTION INTRAMUSCULAR; INTRAVENOUS at 10:27

## 2023-06-23 RX ADMIN — MIDAZOLAM HYDROCHLORIDE 1 MG: 1 INJECTION, SOLUTION INTRAMUSCULAR; INTRAVENOUS at 10:27

## 2023-06-23 RX ADMIN — MIDAZOLAM HYDROCHLORIDE 1 MG: 1 INJECTION, SOLUTION INTRAMUSCULAR; INTRAVENOUS at 10:41

## 2023-06-23 RX ADMIN — FENTANYL CITRATE 50 MCG: 50 INJECTION, SOLUTION INTRAMUSCULAR; INTRAVENOUS at 10:41

## 2023-06-23 NOTE — BRIEF OP NOTE (RAD/CATH)
INTERVENTIONAL RADIOLOGY PROCEDURE NOTE    Date: 6/23/2023    Procedure:   Procedure Summary     Date: 06/23/23 Room / Location: [place] [address] Interventional Radiology    Anesthesia Start:  Anesthesia Stop:     Procedure: IR IVC FILTER REMOVAL Diagnosis:       Lower leg DVT (deep venous thromboembolism), acute, right (HCC)      (IVC filter removal)    Scheduled Providers:  Responsible Provider:     Anesthesia Type: Not recorded ASA Status: Not recorded          Preoperative diagnosis:   1  Lower leg DVT (deep venous thromboembolism), acute, right (HCC)         Postoperative diagnosis: Same  Surgeon: Betzy Pham MD     Assistant: None  No qualified resident was available  Blood loss: Minimal    Specimens: None     Findings: IVC filter removal    Complications: None immediate      Anesthesia: conscious sedation

## 2023-06-23 NOTE — INTERVAL H&P NOTE
Update: (This section must be completed if the H&P was completed greater than 24 hrs to procedure or admission)    H&P reviewed  After examining the patient, I find no changed to the H&P since it had been written  Patient re-evaluated   Accept as history and physical     Vergia MD Dipti/June 23, 2023/10:13 AM

## 2023-06-23 NOTE — DISCHARGE INSTRUCTIONS
Inferior Vena Cava Filter Removal     WHAT YOU NEED TO KNOW:   Inferior vena cava (IVC) filter removal is a procedure to remove your IVC filter  DISCHARGE INSTRUCTIONS:     Wound care: Keep your wound clean and dry  Bandaide may come off in 24 hours  Self-care:   Limit activity for 24 hours  Slowly start to do more each day  Return to your daily activities as directed  Resume your normal diet  Small sips of flat soda will help with nausea  Contact Interventional Radiology at [phone] [place] PATIENTS: Contact Interventional Radiology at [phone]) Arnulfo Gao PATIENTS: Contact Interventional Radiology at [phone]) if:  You have a fever  Persistent nausea or vomiting  You have chills, a cough, or feel weak and achy  Your wound is red, swollen, or draining pus  You have questions or concerns about your condition  Procedural Sedation   WHAT YOU NEED TO KNOW:   Procedural sedation is medicine used during procedures to help you feel relaxed and calm  You will remember little to none of the procedure  After sedation you may feel tired, weak, or unsteady on your feet  You may also have trouble concentrating or short-term memory loss  These symptoms should go away in 24 hours or less  DISCHARGE INSTRUCTIONS:     Call 911 or have someone else call for any of the following: You have sudden trouble breathing  You cannot be woken  Contact Interventional Radiology at [phone]   [place] PATIENTS: Contact Interventional Radiology at [phone]) Arnulfo Gao PATIENTS: Contact Interventional Radiology at [phone]) if any of the following occur: You have a severe headache or dizziness  Your heart is beating faster than usual     You have a fever or chills  Your skin is itchy, swollen, or you have a rash  You have nausea or are vomiting for more than 8 hours after the procedure  You have questions or concerns about your condition or care    Self-care:   Have someone stay with you for 24 hours  This person can drive you to errands and help you do things around the house  This person can also watch for problems  Rest and do quiet activities for 24 hours  Do not exercise, ride a bike, or play sports  Stand up slowly to prevent dizziness and falls  Take short walks around the house with another person  Slowly return to your usual activities the next day  Do not drive or use dangerous machines or tools for 24 hours  You may injure yourself or others  Examples include a lawnmower, saw, or drill  Do not return to work for 24 hours if you use dangerous machines or tools for work  Do not make important decisions for 24 hours  For example, do not sign important papers or invest money  Drink liquids as directed  Liquids help flush the sedation medicine out of your body  Ask how much liquid to drink each day and which liquids are best for you  Eat small, frequent meals to prevent nausea and vomiting  Start with clear liquids such as juice or broth  If you do not vomit after clear liquids, you can eat your usual foods  Do not drink alcohol or take medicines that make you drowsy  This includes medicines that help you sleep and anxiety medicines  Ask your healthcare provider if it is safe for you to take pain medicine  Follow up with your healthcare provider as directed: Write down your questions so you remember to ask them during your visits

## 2023-08-28 ENCOUNTER — HOSPITAL ENCOUNTER (OUTPATIENT)
Dept: RADIOLOGY | Facility: HOSPITAL | Age: 53
Discharge: HOME | End: 2023-08-28
Payer: COMMERCIAL

## 2023-08-28 DIAGNOSIS — M25.512 LEFT SHOULDER PAIN, UNSPECIFIED CHRONICITY: ICD-10-CM

## 2023-08-28 PROCEDURE — 73030 X-RAY EXAM OF SHOULDER: CPT

## 2023-08-28 PROCEDURE — 72050 X-RAY EXAM NECK SPINE 4/5VWS: CPT

## 2023-09-06 ENCOUNTER — OFFICE VISIT (OUTPATIENT)
Dept: OBGYN CLINIC | Facility: CLINIC | Age: 53
End: 2023-09-06
Payer: COMMERCIAL

## 2023-09-06 VITALS
DIASTOLIC BLOOD PRESSURE: 76 MMHG | HEIGHT: 76 IN | WEIGHT: 223 LBS | BODY MASS INDEX: 27.16 KG/M2 | SYSTOLIC BLOOD PRESSURE: 127 MMHG | HEART RATE: 82 BPM

## 2023-09-06 DIAGNOSIS — S46.012A TRAUMATIC TEAR OF LEFT ROTATOR CUFF, UNSPECIFIED TEAR EXTENT, INITIAL ENCOUNTER: ICD-10-CM

## 2023-09-06 DIAGNOSIS — M54.12 RADICULOPATHY, CERVICAL REGION: Primary | ICD-10-CM

## 2023-09-06 PROCEDURE — 99203 OFFICE O/P NEW LOW 30 MIN: CPT | Performed by: ORTHOPAEDIC SURGERY

## 2023-09-06 RX ORDER — CHLORDIAZEPOXIDE HYDROCHLORIDE 25 MG/1
25 CAPSULE, GELATIN COATED ORAL 3 TIMES DAILY PRN
COMMUNITY

## 2023-09-06 RX ORDER — DISULFIRAM 250 MG/1
250 TABLET ORAL DAILY
COMMUNITY

## 2023-09-06 NOTE — PROGRESS NOTES
Orthopedic Sports Medicine New Patient Visit     Assesment:   47 y.o. male with traumatic rotator cuff tear and cervical radiculopathy    Plan:    Upon examination, we did discuss that I am concerned for traumatic rotator cuff tear given his shoulder pain and objective weakness today following a work injury 6 weeks ago. As such, I recommended an MRI to evaluate for internal derangement of the shoulder. We discussed that I would not recommend immobilization with a sling at this time and performing activities as tolerated, using pain as a guide. We also reviewed that if we gave a steroid injection today and there is a rotator cuff injury amenable to surgery, the injection would delay surgery by 6 weeks, ideally 3 months, due to the increased risk of infection associated with the steroid. The patient can continue using ice/heat and over-the-counter medications as needed. Additionally, we had a long discussion about the diagnosis and treatment options for cervical radiculopathy given his radicular symptoms, acute numbness, and dom-scapular pain also present on exam today. Following MRI, we may consider a referral Spine and Pain for further management. In the meantime, the patient can use ibuprofen and ice as needed for pain and inflammation. Follow up: After MRI        Chief Complaint   Patient presents with   • Left Shoulder - Pain, Numbness     Moving an extension ladder   Lifting to the side hurts and numbness down the arm        History of Present Illness: The patient is a 47 y.o., right hand dominant, male, presenting with traumatic left shoulder pain for 6 weeks after a work injury. The patient was walking with a ladder hooked under his arm and was connected to his apprentice when his apprentice fell, which caused the patient's shoulder to jerk backwards. He works as an  and has continued to work since his injury with significant pain.  The pain occurs with overhead motions, lifting, and sleeping directly on the shoulder. Prior to this incident, the patient denies any issues with his shoulder. Since the injury, the patient reports weakness secondary to pain, neck pain, dom-scapular pain, and numbness/tingling that radiates down the arm into the entire hand. He denies issues with fine motor movements. The patient is using a muscle relaxer, ibuprofen, and Tylenol with moderate, temporary symptomatic relief. He has not completed formal PT or had a steroid injection into the shoulder in the past.     The patient is a pack-per-day smoker. He also has a history of diabetes with a most recent A1C of 6.5. The patient denies heart disease and lung disease.      Affected shoulder SANE score: 25    Shoulder Surgical History:  None    Past Medical, Social and Family History:  Past Medical History:   Diagnosis Date   • Ear problems    • HL (hearing loss)    • Psoriasis      Past Surgical History:   Procedure Laterality Date   • CHOLECYSTECTOMY     • GASTRIC BYPASS     • IR IVC FILTER PLACEMENT OPTIONAL/TEMPORARY  3/5/2023   • IR IVC FILTER REMOVAL  6/23/2023     Allergies   Allergen Reactions   • Codeine      Current Outpatient Medications on File Prior to Visit   Medication Sig Dispense Refill   • adalimumab (HUMIRA) 40 mg/0.8 mL PSKT Inject 1 Dose under the skin every 14 (fourteen) days     • chlordiazePOXIDE (LIBRIUM) 25 mg capsule Take 25 mg by mouth 3 (three) times a day as needed for anxiety     • disulfiram (ANTABUSE) 250 mg tablet Take 250 mg by mouth daily     • Empagliflozin-metFORMIN HCl ER (Synjardy XR) 5-1000 MG TB24 Synjardy XR 5 mg-1,000 mg tablet, extended release     • erythromycin (ILOTYCIN) ophthalmic ointment Place a 1/2 inch ribbon of ointment into the lower eyelid 4 times daily for 5 days 3.5 g 0   • Lonapegsomatropin-tcgd (SKYTROFA SC) Inject under the skin Unknown dose     • Multiple Vitamin (multivitamin) capsule Take 1 capsule by mouth daily  0   • rivaroxaban (Xarelto Starter Pack) 15 & 20 MG starter pack Take 1 tablet by mouth 2 (two) times a day Take 15mg tablet twice daily x 21 days followed by 20mg daily afterwards 51 each 0   • rivaroxaban (Xarelto) 20 mg tablet Take 1 tablet (20 mg total) by mouth daily with breakfast 30 tablet 5     No current facility-administered medications on file prior to visit. Social History     Socioeconomic History   • Marital status: /Civil Union     Spouse name: Not on file   • Number of children: Not on file   • Years of education: Not on file   • Highest education level: Not on file   Occupational History   • Not on file   Tobacco Use   • Smoking status: Every Day     Packs/day: 1.00     Types: Cigarettes   • Smokeless tobacco: Never   Vaping Use   • Vaping Use: Never used   Substance and Sexual Activity   • Alcohol use: Not Currently     Alcohol/week: 21.0 standard drinks of alcohol     Types: 21 Cans of beer per week     Comment: average 3 a  day   • Drug use: Not Currently   • Sexual activity: Not on file   Other Topics Concern   • Not on file   Social History Narrative   • Not on file     Social Determinants of Health     Financial Resource Strain: Not on file   Food Insecurity: Not on file   Transportation Needs: Not on file   Physical Activity: Not on file   Stress: Not on file   Social Connections: Not on file   Intimate Partner Violence: Not on file   Housing Stability: Not on file         I have reviewed the past medical, surgical, social and family history, medications and allergies as documented in the EMR. Review of systems: ROS is negative other than that noted in the HPI. Constitutional: Negative for fatigue and fever. HENT: Negative for sore throat. Respiratory: Negative for shortness of breath. Cardiovascular: Negative for chest pain. Gastrointestinal: Negative for abdominal pain. Endocrine: Negative for cold intolerance and heat intolerance. Genitourinary: Negative for flank pain.    Musculoskeletal: Negative for back pain. Skin: Negative for rash. Allergic/Immunologic: Negative for immunocompromised state. Neurological: Negative for dizziness. Psychiatric/Behavioral: Negative for agitation. Physical Exam:    Blood pressure 127/76, pulse 82, height 6' 4" (1.93 m), weight 101 kg (223 lb). General/Constitutional: NAD, well developed, well nourished  HENT: Normocephalic, atraumatic  CV: Intact distal pulses, regular rate  Resp: No respiratory distress or labored breathing  Abdomen: soft, nondistended, non tender   Lymphatic: No lymphadenopathy palpated  Neuro: Alert and Oriented x 3, no focal deficits  Psych: Normal mood, normal affect  Skin: Warm, dry, no rashes, no erythema      Shoulder Exam:      Inspection: No ecchymosis, edema, or deformity. No visualized wounds or skin lesions   Palpation: moderate bicipital groove tenderness. Mild AC joint tenderness. Nia-scapular tenderness. No cervical midline tenderness  Active Motion:       FF: 160 with pain       ER: 60        IR: T12 with pain  Strength: 4/5 empty can, 4+/5 ER,  5/5 IR, 5/5  strength, 5/5 elbow flexion/extension  Sensory - SILT in the Radial / Ulnar / Median / Axillary nerve distributions  Motor - AIN / PIN / Radial / Ulnar / Median / Axillary motor nerves in tact  Palpable Radial pulse  Cap refill <2secs in all digits    + Belly Press     + Barbosa    - Nick's sign        Imaging    I reviewed and interpreted X-rays of the left shoulder which show no acute osseous abnormalities. Mild AC joint degenerative changes. Humerus is well-centered on the glenoid. I reviewed and interpreted X-rays of the cervical spine which show no acute osseous abnormalities. Degenerative changes of C4-5, C5-6, and C6-7 evident. Loss of lordotic curvature.

## 2023-09-16 ENCOUNTER — HOSPITAL ENCOUNTER (OUTPATIENT)
Dept: MRI IMAGING | Facility: HOSPITAL | Age: 53
End: 2023-09-16
Payer: COMMERCIAL

## 2023-09-16 ENCOUNTER — HOSPITAL ENCOUNTER (OUTPATIENT)
Dept: MRI IMAGING | Facility: HOSPITAL | Age: 53
Discharge: HOME | End: 2023-09-16
Payer: COMMERCIAL

## 2023-09-16 DIAGNOSIS — S46.012A TRAUMATIC TEAR OF LEFT ROTATOR CUFF, UNSPECIFIED TEAR EXTENT, INITIAL ENCOUNTER: ICD-10-CM

## 2023-09-16 PROCEDURE — 73221 MRI JOINT UPR EXTREM W/O DYE: CPT

## 2023-09-16 PROCEDURE — G1004 CDSM NDSC: HCPCS

## 2023-09-20 ENCOUNTER — EVALUATION (OUTPATIENT)
Dept: PHYSICAL THERAPY | Facility: MEDICAL CENTER | Age: 53
End: 2023-09-20
Payer: COMMERCIAL

## 2023-09-20 DIAGNOSIS — M54.12 RADICULOPATHY, CERVICAL REGION: Primary | ICD-10-CM

## 2023-09-20 DIAGNOSIS — S46.012A TRAUMATIC TEAR OF LEFT ROTATOR CUFF, UNSPECIFIED TEAR EXTENT, INITIAL ENCOUNTER: ICD-10-CM

## 2023-09-20 PROCEDURE — 97110 THERAPEUTIC EXERCISES: CPT

## 2023-09-20 PROCEDURE — 97161 PT EVAL LOW COMPLEX 20 MIN: CPT

## 2023-09-20 NOTE — PROGRESS NOTES
PT Evaluation     Today's date: 2023  Patient name: Marlene Morgan  : 1970  MRN: [id number]  Referring provider: Antony Galicia  Dx:   Encounter Diagnosis     ICD-10-CM    1. Radiculopathy, cervical region  M54.12 Ambulatory Referral to Physical Therapy      2. Traumatic tear of left rotator cuff, unspecified tear extent, initial encounter  S46.012A Ambulatory Referral to Physical Therapy                     Assessment  Assessment details: Marlene Morgan is a 47 y.o. male who presents to PT with a referral from Dr. Armond Sharpe for a medical diagnosis of Radiculopathy, cervical region, Traumatic tear of left rotator cuff, unspecified tear extent, initial encounter. Patient presented with poor posture upon evaluation. Patient noted numbness and tingling in hand with neck extension that resided and improved with correction of posture. Patient presents to PT with limitations in ROM, strength and functional mobility secondary to pain, decreased muscular endurance and decreased neuromuscular control. Patient demonstrated decreased cervical spine extension, side bending and rotation ROM as well as decreased L shoulder abduction and internal rotation ROM. Patient with decreased L shoulder strength in abduction and ER. Patient noted some tenderness during palpation over infraspinatus, levator scapula, cervical paraspinals, and supraspinatus and infraspinatus tendon. Patient's key impairments include: decreased ROM, decreased strength, decreased endurance, pain with functional activities and poor body mechanics. The limitations listed above affect patient's ability to look over shoulder, look up, reach overhead, reach behind back, reach, lift/carry, push, pull, shower and get dressed, perform recreational activities, work duties, and ADLs and decrease patient's quality of life.  Patient to benefit from skilled PT to address these limitations,  increase ROM, improve strength, reduce pain, improve activity tolerance and improve quality of life   Impairments: abnormal muscle firing, abnormal muscle tone, abnormal or restricted ROM, abnormal movement, activity intolerance, impaired physical strength, lacks appropriate home exercise program, pain with function, poor posture  and poor body mechanics    Symptom irritability: moderateUnderstanding of Dx/Px/POC: good   Prognosis: good    Goals  STG (2-4 Weeks)  Patient will have a decrease in pain by 2 points on the NPRS to improve quality of life in 4 weeks  Patient will have an increase in pain-free shoulder abduction ROM to 170 degrees to be able to reach objects above shoulder height in 4 weeks. Patient will be efficient and compliant with comprehensive HEP    LTG (4-8 Weeks)  Patient will have a FOTO of anticipated or greater by discharge  Patient will be able to work without pain to improve quality of life by discharge. Patient will be able to perform household activities such as cutting grass without pain to improve quality of life by discharge.     Plan  Patient would benefit from: skilled physical therapy  Planned modality interventions: TENS, thermotherapy: hydrocollator packs and cryotherapy  Planned therapy interventions: abdominal trunk stabilization, IASTM, joint mobilization, activity modification, kinesiology taping, ADL training, manual therapy, massage, Sanchez taping, balance, balance/weight bearing training, motor coordination training, behavior modification, muscle pump exercises, body mechanics training, nerve gliding, neuromuscular re-education, breathing training, patient education, postural training, coordination, self care, transfer training, therapeutic training, therapeutic exercise, therapeutic activities, stretching, strengthening, fine motor coordination training, flexibility, functional ROM exercises, gait training, graded activity, graded exercise, graded motor, home exercise program, IADL retraining and work reintegration  Frequency: 2x week  Duration in weeks: 12  Plan of Care beginning date: 2023  Plan of Care expiration date: 2023  Treatment plan discussed with: patient        Subjective Evaluation    History of Present Illness  Onset date: 6 weeks ago. Mechanism of injury: trauma  Mechanism of injury: Patient reports to PT with a CC of neck pain and left shoulder pain that started about 6 weeks ago when they were bending wires and overusing his arm. Patient currently works [employer]. Patient rated their current pain 3/10, at its worst 9/10, and at its best 0/10 on the NPRS. Patent stated that the pain in his neck is not there anymore. Patient described the pain as dull, sharp, tingling and numbness and stated that it lasts 30 minutes to 1 hour. Patient stated that rest and change in position makes it better, and overhead activities, carrying objects, pushing, pulling, job duties, getting dressed, showering, and putting on shoes makes it worse. Patient reported that the pain is worse after use and reports waking them at night. Patient denies changes to  bowel and bladder, chest pain, night pain, or fever. Patient reports no prior treatment or surgeries for CC. Patient's goals of PT are to decrease pain, improve ROM, increase strength, return to recreational activities and return to work without pain   Quality of life: good    Patient Goals  Patient goals for therapy: decreased pain, increased motion, return to sport/leisure activities, return to work and increased strength    Pain  Current pain rating: 3  At best pain ratin  At worst pain ratin  Quality: dull ache, sharp and radiating  Relieving factors: change in position, rest, relaxation and support  Aggravating factors: overhead activity and lifting  Progression: no change          Objective     Concurrent Complaints  Positive for night pain and disturbed sleep.  Negative for dizziness, faints, headaches, nausea/motion sickness, tinnitus, trouble swallowing, difficulty breathing, shortness of breath, respiratory pain and visual change    Postural Observations  Seated posture: poor  Standing posture: poor  Correction of posture: makes symptoms better        Palpation   Left   No palpable tenderness to the anterior deltoid, biceps, cervical interspinals, deltoid, intercostals, latissimus, lower trapezius, middle trapezius, posterior deltoid, subscapularis, supraspinatus, triceps and upper trapezius. Tenderness of the cervical paraspinals, infraspinatus, levator scapulae and rhomboids. Right   No palpable tenderness to the anterior deltoid, biceps, cervical interspinals, cervical paraspinals, deltoid, infraspinatus, intercostals, latissimus, levator scapulae, lower trapezius, middle trapezius, posterior deltoid, rhomboids, subscapularis, supraspinatus, triceps and upper trapezius. Tenderness   Cervical Spine   Tenderness in the left scapula. No tenderness in the facet joint, spinous process and right scapula. Left Shoulder   Tenderness in the infraspinatus tendon and supraspinatus tendon. No tenderness in the [facility] joint, biceps tendon (proximal), bicipital groove, clavicle and coracoid process. Right Shoulder  No tenderness in the [facility] joint, biceps tendon (proximal), bicipital groove, clavicle, coracoid process, infraspinatus tendon and supraspinatus tendon.      Cervical/Thoracic Screen   Thoracic range of motion within normal limits    Neurological Testing     Sensation   Cervical/Thoracic   Left   Intact: light touch    Right   Intact: light touch    Shoulder   Left Shoulder   Intact: light touch    Right Shoulder   Intact: light touch    Reflexes   Left   Biceps (C5/C6): normal (2+)    Right   Biceps (C5/C6): normal (2+)    Active Range of Motion   Cervical/Thoracic Spine       Cervical    Flexion:  WFL  Extension:  Restriction level: moderate  Left lateral flexion: 5 degrees     with pain Restriction level: maximal  Right lateral flexion: 20 degrees     Restriction level maximal  Left rotation:  Restriction level: moderate  Right rotation:  Restriction level: minimal  Left Shoulder   Flexion: WFL and with pain  Abduction: 120 degrees with pain  External rotation BTH: T4 WFL  Internal rotation BTB: T7 with pain    Right Shoulder   Normal active range of motion  External rotation BTH: WFL  Internal rotation BTB: WFL    Passive Range of Motion   Left Shoulder   Abduction: 155 degrees with pain    Right Shoulder   Normal passive range of motion    Strength/Myotome Testing     Left Shoulder     Planes of Motion   Flexion: 5   Abduction: 5   External rotation at 0°: 5   Internal rotation at 0°: 5     Isolated Muscles   Biceps: 5   Triceps: 5     Right Shoulder     Planes of Motion   Flexion: 5 (pain)   Extension: 4- (pain)   Abduction: 4- (pain)   External rotation at 0°: 4- (pain)   Internal rotation at 0°: 5     Isolated Muscles   Biceps: 5   Triceps: 5     Left Elbow   Flexion: 5  Extension: 5    Right Elbow   Flexion: 5  Extension: 5    Tests   Cervical   Negative Sharp-La test.     Left   Positive Spurling's Test A. Right   Positive Spurling's Test A. Left Shoulder   Positive empty can, Hawkin's and painful arc. Negative jerk and Taty. Right Shoulder   Negative Taty. General Comments:    Upper quarter screen   Elbow: unremarkable  Hand/wrist: unremarkable  Neuro Exam:     Headaches   Patient reports headaches: No.       HEP demonstrated and performed:    Access Code: [email or web address]  URL: [email or web address]  Date: 09/20/2023  Prepared by: Chandler Franco    Exercises  - Doorway Pec Stretch at 90 Degrees Abduction  - 1 x daily - 7 x weekly - 1 sets - 3 reps - 30s hold  - Seated Scapular Retraction  - 1 x daily - 7 x weekly - 2 sets - 10 reps  - Standing Isometric Shoulder External Rotation with Doorway and Towel Roll  - 1 x daily - 7 x weekly - 2 sets - 5 reps - 10s hold       Precautions: standard precautions,   Past Medical History:   Diagnosis Date   • Ear problems    • HL (hearing loss)    • Psoriasis          PT 1:1 entire time  Manuals             PROM cervical             PROM L shoulder             Jt mobes                          Neuro Re-Ed             scapular retractions             4 way isos             Cervical retractions             TB iso walkouts             Self Snags                                       Ther Ex             TB Rows             TB GH Ext             Doorway Stretch             Levator Stretch             Upper Trap Stretch                                                    Ther Activity                                       Gait Training                                       Modalities

## 2023-09-22 ENCOUNTER — OFFICE VISIT (OUTPATIENT)
Dept: OBGYN CLINIC | Facility: CLINIC | Age: 53
End: 2023-09-22
Payer: COMMERCIAL

## 2023-09-22 ENCOUNTER — TELEPHONE (OUTPATIENT)
Dept: OBGYN CLINIC | Facility: CLINIC | Age: 53
End: 2023-09-22

## 2023-09-22 VITALS
WEIGHT: 223 LBS | HEIGHT: 76 IN | DIASTOLIC BLOOD PRESSURE: 98 MMHG | SYSTOLIC BLOOD PRESSURE: 142 MMHG | BODY MASS INDEX: 27.16 KG/M2 | HEART RATE: 82 BPM

## 2023-09-22 DIAGNOSIS — M54.12 RADICULOPATHY, CERVICAL REGION: Primary | ICD-10-CM

## 2023-09-22 DIAGNOSIS — M75.82 ROTATOR CUFF TENDINITIS, LEFT: ICD-10-CM

## 2023-09-22 PROCEDURE — 99213 OFFICE O/P EST LOW 20 MIN: CPT | Performed by: ORTHOPAEDIC SURGERY

## 2023-09-22 RX ORDER — METHYLPREDNISOLONE 4 MG/1
TABLET ORAL
Qty: 21 EACH | Refills: 0 | Status: SHIPPED | OUTPATIENT
Start: 2023-09-22

## 2023-09-22 NOTE — TELEPHONE ENCOUNTER
Called patient and left a voicemail for them to  a purse that was left in the room after they saw the provider. I informed them that the purse was at the .

## 2023-09-22 NOTE — PROGRESS NOTES
Orthopedic Sports Medicine New Patient Visit     Assesment:   47 y.o. male with cervical radiculopathy and left rotator cuff tendinitis    Plan:    Upon examination and review of shoulder MRI today, the patient has well-maintained shoulder motion and strength without evidence of tearing on imaging. As such, there is no need for surgical intervention at this time and we can determine that the main source of his pain is likely from the neck. He may continue formal PT that focuses on shoulder mobility, strength, and stabilization as I believe this will help symptomatically. The patient can discontinue use of the sling and we discussed that he can continue activity as tolerated without need for immobilization. He has no red flag signs or symptoms related to the neck. Though I do believe this is his primary source of pain. I recommended continue PT. If his pain does not improve after 6 weeks of PT, we will consider an MRI. Follow up:    6 weeks following PT        Chief Complaint   Patient presents with   • Left Shoulder - Follow-up     MRI Review         History of Present Illness: The patient is a 47 y.o., right hand dominant, male, presenting for left shoulder MRI review and reevaluation of cervical radiculopathy. Today, he reports the pain localized to the periscapular area has gradually improved and is less tender to touch today. The patient has attended one session of PT since his last visit on 9/6/23. Kiersten Carrasco reports persistent numbness/tingling into the first four digits and intermittent neck pain. The patient denies new injury or trauma, swelling, and weakness. He has been using a sling at work to help prevent painful motions of the shoulder. The patient is a pack-per-day smoker. He also has a history of diabetes with a most recent A1C of 6.5. The patient denies heart disease and lung disease.        Shoulder Surgical History:  None    Past Medical, Social and Family History:  Past Medical History:   Diagnosis Date   • Ear problems    • HL (hearing loss)    • Psoriasis      Past Surgical History:   Procedure Laterality Date   • CHOLECYSTECTOMY     • GASTRIC BYPASS     • IR IVC FILTER PLACEMENT OPTIONAL/TEMPORARY  3/5/2023   • IR IVC FILTER REMOVAL  6/23/2023     Allergies   Allergen Reactions   • Codeine      Current Outpatient Medications on File Prior to Visit   Medication Sig Dispense Refill   • adalimumab (HUMIRA) 40 mg/0.8 mL PSKT Inject 1 Dose under the skin every 14 (fourteen) days     • chlordiazePOXIDE (LIBRIUM) 25 mg capsule Take 25 mg by mouth 3 (three) times a day as needed for anxiety     • disulfiram (ANTABUSE) 250 mg tablet Take 250 mg by mouth daily     • Empagliflozin-metFORMIN HCl ER (Synjardy XR) 5-1000 MG TB24 Synjardy XR 5 mg-1,000 mg tablet, extended release     • erythromycin (ILOTYCIN) ophthalmic ointment Place a 1/2 inch ribbon of ointment into the lower eyelid 4 times daily for 5 days 3.5 g 0   • Lonapegsomatropin-tcgd (SKYTROFA SC) Inject under the skin Unknown dose     • Multiple Vitamin (multivitamin) capsule Take 1 capsule by mouth daily  0   • rivaroxaban (Xarelto Starter Pack) 15 & 20 MG starter pack Take 1 tablet by mouth 2 (two) times a day Take 15mg tablet twice daily x 21 days followed by 20mg daily afterwards 51 each 0   • rivaroxaban (Xarelto) 20 mg tablet Take 1 tablet (20 mg total) by mouth daily with breakfast 30 tablet 5     No current facility-administered medications on file prior to visit.      Social History     Socioeconomic History   • Marital status: /Civil Union     Spouse name: Not on file   • Number of children: Not on file   • Years of education: Not on file   • Highest education level: Not on file   Occupational History   • Not on file   Tobacco Use   • Smoking status: Every Day     Packs/day: 1.00     Types: Cigarettes   • Smokeless tobacco: Never   Vaping Use   • Vaping Use: Never used   Substance and Sexual Activity   • Alcohol use: Not Currently     Alcohol/week: 21.0 standard drinks of alcohol     Types: 21 Cans of beer per week     Comment: average 3 a  day   • Drug use: Not Currently   • Sexual activity: Not on file   Other Topics Concern   • Not on file   Social History Narrative   • Not on file     Social Determinants of Health     Financial Resource Strain: Not on file   Food Insecurity: Not on file   Transportation Needs: Not on file   Physical Activity: Not on file   Stress: Not on file   Social Connections: Not on file   Intimate Partner Violence: Not on file   Housing Stability: Not on file         I have reviewed the past medical, surgical, social and family history, medications and allergies as documented in the EMR. Review of systems: ROS is negative other than that noted in the HPI. Constitutional: Negative for fatigue and fever. HENT: Negative for sore throat. Respiratory: Negative for shortness of breath. Cardiovascular: Negative for chest pain. Gastrointestinal: Negative for abdominal pain. Endocrine: Negative for cold intolerance and heat intolerance. Genitourinary: Negative for flank pain. Musculoskeletal: Negative for back pain. Skin: Negative for rash. Allergic/Immunologic: Negative for immunocompromised state. Neurological: Negative for dizziness. Psychiatric/Behavioral: Negative for agitation. Physical Exam:    Blood pressure 142/98, pulse 82, height 6' 4" (1.93 m), weight 101 kg (223 lb). General/Constitutional: NAD, well developed, well nourished  HENT: Normocephalic, atraumatic  CV: Intact distal pulses, regular rate  Resp: No respiratory distress or labored breathing  Abdomen: soft, nondistended, non tender   Lymphatic: No lymphadenopathy palpated  Neuro: Alert and Oriented x 3, no focal deficits  Psych: Normal mood, normal affect  Skin: Warm, dry, no rashes, no erythema      Shoulder Exam:      Inspection: No ecchymosis, edema, or deformity.  No visualized wounds or skin lesions Palpation: Mild AC joint tenderness. Mild dom-scapular tenderness that is improved since last exam. No cervical midline tenderness  Active Motion:       FF: 160, symmetric without pain       ER: 70       IR: T12, symmetric without pain  Strength: 5/5 empty can, 5/5 ER,  5/5 IR  Sensory - SILT in the Radial / Ulnar / Median / Axillary nerve distributions  Motor - AIN / PIN / Radial / Ulnar / Median / Axillary motor nerves in tact  Palpable Radial pulse  Cap refill <2secs in all digits        Imaging    I reviewed and interpreted X-rays of the left shoulder which show no acute osseous abnormalities. Mild AC joint degenerative changes. Humerus is well-centered on the glenoid. I reviewed MRI of the left shoulder, which demonstrates mild supraspinatus tendinosis with no evidence of rotator cuff tear. I reviewed and interpreted X-rays of the cervical spine which show no acute osseous abnormalities. Degenerative changes of C4-5, C5-6, and C6-7 evident. Loss of lordotic curvature.

## 2023-09-25 ENCOUNTER — OFFICE VISIT (OUTPATIENT)
Dept: PHYSICAL THERAPY | Facility: MEDICAL CENTER | Age: 53
End: 2023-09-25
Payer: COMMERCIAL

## 2023-09-25 DIAGNOSIS — S46.012A TRAUMATIC TEAR OF LEFT ROTATOR CUFF, UNSPECIFIED TEAR EXTENT, INITIAL ENCOUNTER: ICD-10-CM

## 2023-09-25 DIAGNOSIS — M54.12 RADICULOPATHY, CERVICAL REGION: Primary | ICD-10-CM

## 2023-09-25 PROCEDURE — 97140 MANUAL THERAPY 1/> REGIONS: CPT

## 2023-09-25 PROCEDURE — 97112 NEUROMUSCULAR REEDUCATION: CPT

## 2023-09-25 PROCEDURE — 97110 THERAPEUTIC EXERCISES: CPT

## 2023-09-25 NOTE — PROGRESS NOTES
Daily Note     Today's date: 2023  Patient name: Aide Hunt  : 1970  MRN: [id number]  Referring provider: Marco Antonio Shields PA-C  Dx:   Encounter Diagnosis     ICD-10-CM    1. Radiculopathy, cervical region  M54.12       2. Traumatic tear of left rotator cuff, unspecified tear extent, initial encounter  S46.012A           Start Time: 1604  Stop Time: 1700  Total time in clinic (min): 56 minutes    Subjective: Patient stated that he is not having any pain in his shoulder today, just having some stiffness. Objective: See treatment diary below      Assessment: Tolerated treatment well. Patient able to complete all exercises without increase in symptoms. Patient given updated HEP to accomodate current status, busy schedule, and co-pay amount. Patient demonstrated understanding of all exercises and frequency of exercises given. Patient noted improvements in stiffness following treatment session. Patient demonstrated fatigue post treatment, exhibited good technique with therapeutic exercises and would benefit from continued PT      Plan: Continue per plan of care.       Precautions: standard precautions,   Past Medical History:   Diagnosis Date   • Ear problems    • HL (hearing loss)    • Psoriasis          PT 1:1 [id number]  Manuals             PROM cervical IB            PROM L shoulder IB            Jt mobes IB, L shoulder                         Neuro Re-Ed             scapular retractions 10x             4 way isos 10x5" hold            Cervical retractions 20x             TB iso walkouts 15x ea            Self Snags 10x ea            Cane aarom supine 15x flexion            Reverse shoulder shrugs 20x            Ther Ex             TB Rows 20x RTB            TB GH Ext 20x RTB            Doorway Stretch 30" 3x            Levator Stretch 30" 3x            Upper Trap Stretch             Pec minor stretch 10x10" hold with ball at wall                                      Ther Activity Gait Training                                       Modalities

## 2023-09-27 ENCOUNTER — APPOINTMENT (OUTPATIENT)
Dept: PHYSICAL THERAPY | Facility: MEDICAL CENTER | Age: 53
End: 2023-09-27
Payer: COMMERCIAL

## 2023-11-02 ENCOUNTER — APPOINTMENT (OUTPATIENT)
Dept: LAB | Facility: MEDICAL CENTER | Age: 53
End: 2023-11-02
Payer: COMMERCIAL

## 2023-11-02 DIAGNOSIS — L40.0 PSORIASIS VULGARIS: ICD-10-CM

## 2023-11-02 LAB
ALBUMIN SERPL BCP-MCNC: 4.1 G/DL (ref 3.5–5)
ALP SERPL-CCNC: 102 U/L (ref 34–104)
ALT SERPL W P-5'-P-CCNC: 38 U/L (ref 7–52)
AST SERPL W P-5'-P-CCNC: 26 U/L (ref 13–39)
BILIRUB DIRECT SERPL-MCNC: 0.09 MG/DL (ref 0–0.2)
BILIRUB SERPL-MCNC: 0.49 MG/DL (ref 0.2–1)
CHOLEST SERPL-MCNC: 192 MG/DL
HBV CORE AB SER QL: NORMAL
HBV SURFACE AB SER-ACNC: <3 MIU/ML
HBV SURFACE AG SER QL: NORMAL
HCV AB SER QL: NORMAL
HDLC SERPL-MCNC: 53 MG/DL
LDLC SERPL CALC-MCNC: 108 MG/DL (ref 0–100)
NONHDLC SERPL-MCNC: 139 MG/DL
PROT SERPL-MCNC: 7.5 G/DL (ref 6.4–8.4)
TRIGL SERPL-MCNC: 155 MG/DL

## 2023-11-02 PROCEDURE — 80061 LIPID PANEL: CPT

## 2023-11-02 PROCEDURE — 86480 TB TEST CELL IMMUN MEASURE: CPT

## 2023-11-02 PROCEDURE — 86803 HEPATITIS C AB TEST: CPT

## 2023-11-02 PROCEDURE — 80076 HEPATIC FUNCTION PANEL: CPT

## 2023-11-02 PROCEDURE — 86706 HEP B SURFACE ANTIBODY: CPT

## 2023-11-02 PROCEDURE — 86704 HEP B CORE ANTIBODY TOTAL: CPT

## 2023-11-02 PROCEDURE — 87340 HEPATITIS B SURFACE AG IA: CPT

## 2023-11-03 LAB
GAMMA INTERFERON BACKGROUND BLD IA-ACNC: <0 IU/ML
M TB IFN-G BLD-IMP: NEGATIVE
M TB IFN-G CD4+ BCKGRND COR BLD-ACNC: 0 IU/ML
M TB IFN-G CD4+ BCKGRND COR BLD-ACNC: 0 IU/ML
MITOGEN IGNF BCKGRD COR BLD-ACNC: 10 IU/ML

## 2023-12-22 ENCOUNTER — APPOINTMENT (OUTPATIENT)
Dept: LAB | Facility: MEDICAL CENTER | Age: 53
End: 2023-12-22
Payer: COMMERCIAL

## 2023-12-22 DIAGNOSIS — E78.2 MIXED HYPERLIPIDEMIA: ICD-10-CM

## 2023-12-22 DIAGNOSIS — N40.0 BENIGN PROSTATIC HYPERPLASIA, UNSPECIFIED WHETHER LOWER URINARY TRACT SYMPTOMS PRESENT: ICD-10-CM

## 2023-12-22 DIAGNOSIS — I10 ESSENTIAL HYPERTENSION, BENIGN: ICD-10-CM

## 2023-12-22 DIAGNOSIS — E11.9 TYPE 2 DIABETES MELLITUS WITHOUT COMPLICATION, WITHOUT LONG-TERM CURRENT USE OF INSULIN (HCC): ICD-10-CM

## 2023-12-22 LAB
ALBUMIN SERPL BCP-MCNC: 4.3 G/DL (ref 3.5–5)
ALP SERPL-CCNC: 109 U/L (ref 34–104)
ALT SERPL W P-5'-P-CCNC: 38 U/L (ref 7–52)
ANION GAP SERPL CALCULATED.3IONS-SCNC: 9 MMOL/L
AST SERPL W P-5'-P-CCNC: 26 U/L (ref 13–39)
BILIRUB SERPL-MCNC: 0.84 MG/DL (ref 0.2–1)
BUN SERPL-MCNC: 15 MG/DL (ref 5–25)
CALCIUM SERPL-MCNC: 9.6 MG/DL (ref 8.4–10.2)
CHLORIDE SERPL-SCNC: 103 MMOL/L (ref 96–108)
CHOLEST SERPL-MCNC: 234 MG/DL
CO2 SERPL-SCNC: 27 MMOL/L (ref 21–32)
CREAT SERPL-MCNC: 1.05 MG/DL (ref 0.6–1.3)
ERYTHROCYTE [SEDIMENTATION RATE] IN BLOOD: 20 MM/HOUR (ref 0–19)
GFR SERPL CREATININE-BSD FRML MDRD: 81 ML/MIN/1.73SQ M
GLUCOSE P FAST SERPL-MCNC: 185 MG/DL (ref 65–99)
HDLC SERPL-MCNC: 69 MG/DL
LDLC SERPL CALC-MCNC: 147 MG/DL (ref 0–100)
NONHDLC SERPL-MCNC: 165 MG/DL
POTASSIUM SERPL-SCNC: 4.6 MMOL/L (ref 3.5–5.3)
PROT SERPL-MCNC: 7.8 G/DL (ref 6.4–8.4)
PSA SERPL-MCNC: 1.15 NG/ML (ref 0–4)
SODIUM SERPL-SCNC: 139 MMOL/L (ref 135–147)
TRIGL SERPL-MCNC: 91 MG/DL

## 2023-12-22 PROCEDURE — 36415 COLL VENOUS BLD VENIPUNCTURE: CPT

## 2023-12-22 PROCEDURE — 85652 RBC SED RATE AUTOMATED: CPT

## 2023-12-22 PROCEDURE — 80061 LIPID PANEL: CPT

## 2023-12-22 PROCEDURE — 84153 ASSAY OF PSA TOTAL: CPT

## 2023-12-22 PROCEDURE — 80053 COMPREHEN METABOLIC PANEL: CPT

## 2024-03-20 ENCOUNTER — HOSPITAL ENCOUNTER (OUTPATIENT)
Dept: VASCULAR ULTRASOUND | Facility: HOSPITAL | Age: 54
Discharge: HOME | End: 2024-03-20
Attending: INTERNAL MEDICINE
Payer: COMMERCIAL

## 2024-03-20 DIAGNOSIS — I82.409 ACUTE EMBOLISM AND THROMBOSIS OF UNSPECIFIED DEEP VEINS OF UNSPECIFIED LOWER EXTREMITY (HCC): ICD-10-CM

## 2024-03-20 PROCEDURE — 93971 EXTREMITY STUDY: CPT

## 2024-03-20 PROCEDURE — 93971 EXTREMITY STUDY: CPT | Performed by: SURGERY

## 2024-05-30 RX ORDER — LANOLIN ALCOHOL/MO/W.PET/CERES
1 CREAM (GRAM) TOPICAL DAILY
COMMUNITY

## 2024-05-30 RX ORDER — METHOCARBAMOL 750 MG/1
750 TABLET, FILM COATED ORAL
COMMUNITY

## 2024-05-30 RX ORDER — NALTREXONE HYDROCHLORIDE 50 MG/1
1 TABLET, FILM COATED ORAL DAILY
COMMUNITY

## 2024-06-03 ENCOUNTER — OFFICE VISIT (OUTPATIENT)
Dept: UROLOGY | Facility: CLINIC | Age: 54
End: 2024-06-03
Payer: COMMERCIAL

## 2024-06-03 VITALS
BODY MASS INDEX: 27.4 KG/M2 | WEIGHT: 225 LBS | OXYGEN SATURATION: 97 % | HEIGHT: 76 IN | SYSTOLIC BLOOD PRESSURE: 138 MMHG | DIASTOLIC BLOOD PRESSURE: 88 MMHG | TEMPERATURE: 97.8 F | HEART RATE: 77 BPM

## 2024-06-03 DIAGNOSIS — Z12.5 SCREENING PSA (PROSTATE SPECIFIC ANTIGEN): Primary | ICD-10-CM

## 2024-06-03 PROCEDURE — 99204 OFFICE O/P NEW MOD 45 MIN: CPT | Performed by: PHYSICIAN ASSISTANT

## 2024-06-03 RX ORDER — ASPIRIN 81 MG/1
81 TABLET, CHEWABLE ORAL DAILY
COMMUNITY

## 2024-06-03 RX ORDER — EMPAGLIFLOZIN, METFORMIN HYDROCHLORIDE 5; 1000 MG/1; MG/1
TABLET, EXTENDED RELEASE ORAL
COMMUNITY

## 2024-06-03 RX ORDER — VIT C/B6/B5/MAGNESIUM/HERB 173 50-5-6-5MG
500 CAPSULE ORAL DAILY
COMMUNITY

## 2024-06-03 RX ORDER — ALPRAZOLAM 0.25 MG/1
0.25 TABLET ORAL DAILY PRN
COMMUNITY
Start: 2024-04-18

## 2024-06-03 NOTE — PROGRESS NOTES
6/3/2024      Chief Complaint   Patient presents with    Abnormal Penile Curvature         Assessment and Plan    53 y.o. male     1.  Peyronie's disease  -Exam today showing Peyronie's plaque on right side shaft of penis  - Discussed conservative measures vs workup for treatment options  - Patient would like to purse workup  - Patient will return for workup. Trimix ordered to compounding pharmacy. Patient will bring medication to office visit for complete workup and measure of curvature.     2. Rising PSA  - PSA recently 1.5, previously 0.8  - PSA ordered due to rise in PSA  - Call with any questions or concerns in the meantime  - All questions answered; patient understands and agrees with plan       History of Present Illness  Tyrel Pollock is a 53 y.o. male new patient here for evaluation of Peyronie's disease.    Patient states that he has been having curvature of the penis to the right for the past 6 months. States this is a 45 degree angle.  This is not painful for him or his partner.  Denies urinary symptoms, gross hematuria, pain with ejaculation, gross hematuria, blood with ejaculation, prior  surgical manipulation.  PSA 1.5 recently, baseline 0.8.  Denies family history of  malignancies.    Review of Systems   Constitutional:  Negative for activity change, appetite change, chills and fever.   HENT:  Negative for congestion and trouble swallowing.    Respiratory:  Negative for cough and shortness of breath.    Cardiovascular:  Negative for chest pain, palpitations and leg swelling.   Gastrointestinal:  Negative for abdominal pain, constipation, diarrhea, nausea and vomiting.   Genitourinary:  Negative for difficulty urinating, dysuria, flank pain, frequency, hematuria and urgency.   Musculoskeletal:  Negative for back pain and gait problem.   Skin:  Negative for wound.   Allergic/Immunologic: Negative for immunocompromised state.   Neurological:  Negative for dizziness and syncope.   Hematological:   "Does not bruise/bleed easily.   Psychiatric/Behavioral:  Negative for confusion.    All other systems reviewed and are negative.      Vitals  Vitals:    06/03/24 1416   BP: 138/88   Pulse: 77   Temp: 97.8 °F (36.6 °C)   TempSrc: Temporal   SpO2: 97%   Weight: 102 kg (225 lb)   Height: 6' 4\" (1.93 m)       Physical Exam  Constitutional:       General: He is not in acute distress.     Appearance: Normal appearance. He is not ill-appearing, toxic-appearing or diaphoretic.   HENT:      Head: Normocephalic.      Nose: No congestion.   Eyes:      General: No scleral icterus.        Right eye: No discharge.         Left eye: No discharge.      Conjunctiva/sclera: Conjunctivae normal.      Pupils: Pupils are equal, round, and reactive to light.   Pulmonary:      Effort: Pulmonary effort is normal.   Musculoskeletal:      Cervical back: Normal range of motion.   Skin:     General: Skin is warm and dry.      Coloration: Skin is not jaundiced or pale.      Findings: No bruising, erythema, lesion or rash.   Neurological:      General: No focal deficit present.      Mental Status: He is alert and oriented to person, place, and time. Mental status is at baseline.      Gait: Gait normal.   Psychiatric:         Mood and Affect: Mood normal.         Behavior: Behavior normal.         Thought Content: Thought content normal.         Judgment: Judgment normal.           Past History  Past Medical History:   Diagnosis Date    Ear problems     HL (hearing loss)     Psoriasis      Social History     Socioeconomic History    Marital status: /Civil Union     Spouse name: None    Number of children: None    Years of education: None    Highest education level: None   Occupational History    None   Tobacco Use    Smoking status: Every Day     Current packs/day: 1.00     Types: Cigarettes    Smokeless tobacco: Never   Vaping Use    Vaping status: Every Day    Substances: Nicotine   Substance and Sexual Activity    Alcohol use: Not " Currently     Alcohol/week: 21.0 standard drinks of alcohol     Types: 21 Cans of beer per week     Comment: average 3 a  day    Drug use: Not Currently    Sexual activity: Yes     Partners: Female   Other Topics Concern    None   Social History Narrative    None     Social Determinants of Health     Financial Resource Strain: Not on file   Food Insecurity: Not on file   Transportation Needs: Not on file   Physical Activity: Not on file   Stress: Not on file   Social Connections: Not on file   Intimate Partner Violence: Not At Risk (12/18/2023)    [facility]    Interpersonal Safety     Safe in Home: Yes     Are you in immediate danger?: Not on file     Is your partner at the health facility now?: Not on file     Do you want to (or have to) go home with your partner?: Not on file     Do you have someplace safe to go?: Not on file     Have there been threats or direct abuse of you or your children?: No     When did the abuse occur?: Not on file     Do you feel you are still at risk?: Not on file     Are you in contact with your ex-partner or do you share children or custody?: Not on file     Are you afraid your life may be in danger?: Not on file     Has the violence gotten worse or is it getting scarier? More often?: Not on file     Has anyone ever choked or tried to choke you?: No     Do you feel you are still at risk for choking?: Not on file     Are you in contact with ex-partner who choked or attempted to choke you? or do you share children or custody?: Not on file     Are you afraid your life may be in danger due to choking?: Not on file     Has the choking gotten worse or is it getting scarier? More often?: Not on file     Has your partner used weapons, alcohol or drugs?: Not on file     Has your partner ever held you or your children against your will?: Not on file     Does your partner ever watch you  closely, follow you or stalk you?: Not on file     Has your partner ever threatened to kill you, him/herself or your children?: Not on file     When did the choking or choking attempt occur?: Not on file     Do you feel you are still at risk for choking?: Not on file     Safe in Relationship: Yes   Housing Stability: Not on file     Social History     Tobacco Use   Smoking Status Every Day    Current packs/day: 1.00    Types: Cigarettes   Smokeless Tobacco Never     History reviewed. No pertinent family history.    The following portions of the patient's history were reviewed and updated as appropriate: allergies, current medications, past medical history, past social history, past surgical history and problem list.    Results  No results found for this or any previous visit (from the past 1 hour(s)).]  Lab Results   Component Value Date    PSA 1.15 12/22/2023    PSA 0.8 01/21/2023    PSA 0.8 03/05/2022    PSA 0.7 08/15/2020     Lab Results   Component Value Date    CALCIUM 9.6 12/22/2023    K 4.6 12/22/2023    CO2 27 12/22/2023     12/22/2023    BUN 15 12/22/2023    CREATININE 1.05 12/22/2023     Lab Results   Component Value Date    WBC 8.66 12/22/2023    HGB 17.0 12/22/2023    HCT 51.3 (H) 12/22/2023    MCV 98 12/22/2023     12/22/2023       June Becker PA-C

## 2024-08-03 ENCOUNTER — APPOINTMENT (OUTPATIENT)
Dept: LAB | Facility: MEDICAL CENTER | Age: 54
End: 2024-08-03
Payer: COMMERCIAL

## 2024-08-03 DIAGNOSIS — Z12.5 SCREENING PSA (PROSTATE SPECIFIC ANTIGEN): ICD-10-CM

## 2024-08-03 LAB — PSA SERPL-MCNC: 1.53 NG/ML (ref 0–4)

## 2024-08-03 PROCEDURE — 36415 COLL VENOUS BLD VENIPUNCTURE: CPT

## 2024-08-03 PROCEDURE — 84153 ASSAY OF PSA TOTAL: CPT

## 2024-09-10 ENCOUNTER — TELEPHONE (OUTPATIENT)
Dept: UROLOGY | Facility: CLINIC | Age: 54
End: 2024-09-10

## 2024-09-10 NOTE — TELEPHONE ENCOUNTER
SRIDEVI stating appt time changed to 1130 per 's request. I advised to call office to confirm. If he calls back and states he cannot make that appt, then will need to be rescheduled. Will need an appt earlier in the day. Cannot be last appt.

## 2024-09-10 NOTE — PROGRESS NOTES
UROLOGY FOLLOW-UP ENCOUNTER    Tyrel Pollock is a 53 y.o. male with Peyronie's disease    Pertinent non-urologic PMH: Plaque psoriasis, alcohol abuse, lower extremity DVT, hereditary hemochromatosis    Pertinent non-urologic PSH: Cholecystectomy, gastric bypass, IVC filter    Anticoagulation: ASA81, (prev on Xarelto)    PSA 0.8 on 1/21/2023    PSA 1.15 on 12/22/2023    PSA 1.53 on 8/3/2024        Penile curvature history:  -Penile curvature present since birth? No  -Date penile curvature was first noticed: 2023  -Penile curvature painful during erections? Never  -Direction of penile curvature: right  -Patient estimation of degree of penile curvature: 45  -Does patient have penile plaque present? If yes, where?: yes, right base  -Does patient require medication to achieve erection? No  -Does the penile curvature make intercourse difficult or painful for patient: No  -Does the penile curvature make intercourse difficult or painful for the partner: No  -Previous therapies attempted for penile curvature (medical or surgical): none      Assessment and plan:     Peyronie's disease    We had an extensive discussion in the office today regarding Peyronie's disease.  I then explained to patient that treatment for Peyronie's disease is not emergent and is purely elective and designed to beneficially impact quality of life.    We reviewed proposed pathophysiology of the disease.  I explained that the disease is considered to have 2 separate phases, the acute phase and the stable phase.  I explained that during the acute phase, erections are generally painful and the degree of curvature gradually increases over time.  I explained that the acute phase generally lasts about 6 to 9 months.  I explained that by the time erections are no longer painful and the degree of curvature has stabilized, the patient is considered to be in the stable phase.    I explained that while patients are in the acute phase, it is generally recommended  that patients do not undergo correction of the disease.  I explained that penile straightening treatments are most often reserved for the stable phase of disease.  I explained that in the setting of active phase, patients are recommended to trial NSAIDs for ongoing pain.    I then explained that once patient enters stable phase, there are other treatment options available. The choice of treatments often comes down to the degree of curvature, location of curvature, and the degree of bother for the patient.    I therefore explained that per AUA guidelines, Peyronie's patients should have in-office test erections with intracavernosal injection. I reviewed the process of office test erection. I explained that in these cases, the provider will order a small supply of Trimix injection medication for the patient. The medication will be sent from the compound pharmacy to the patient's address. The patient then brings the medication and injection supplies to his office appointment. I then explained that on the follow-up appointment, the patient is injected by the provider in order to induce erection. Once erection is present, measurements are taken regarding location of curvature and degree of curvature.    We reviewed the risks of receiving Trimix for test injection. These risks include: pain/swelling/bruising at injection site, priapism, blood in urine, fainting/dizziness, development of scar tissue at injection site, low blood pressure, allergic reaction (rash/itching/swelling/breathing difficulties). We discussed the potential issue of priapism in more detail. I explained that this is prolonged erection. I explained that typically, the erection should go down about 1 hour after administration. I explained that by definition priapism was erection lasting longer than 4 hours. I explained that it was important to seek urgent medical attention in cases such as this, as prolonged erections can lead to permanent fibrosis in  erectile tissues leading to permanent erectile dysfunction. Patient verbalized understanding.    I explained that per AUA guidelines, patients with penile curvature between 30 and 90 degrees and intact erectile function (with or without medications), patients may consider intralesional injection of collagenase clostridium histolyticum (commonly known by trade name, Xiaflex) in combination with modeling (explained that this is when the penis is bent in the direction opposite of the curvature at the site of the plaque in hopes of making the penis more straight.    I explained that intralesional injection of collagenase clostridium histolyticum did have some risks including but not limited to: penile ecchymosis, swelling, pain, and corporal rupture.    I also explained that alternative injection treatments include intralesional interferon alpha-2b (side effects sinusitis, flu-like symptoms, minor penile swelling) and intralesional verapamil (side effects including penile bruising, dizziness, nausea, pain at injection site).    I explained that patients often inquire about extracorporeal shock wave therapy for Peyronie's disease, but studies have only shown that this potentially helps improve Peyronie's-associated pain rather than correct any curvature.    We then discussed the possible surgical options for correction of Peyronie's disease. I explained that surgical correct could only be considered in patients with stable disease.    I explained that Peyronie's patients with good erectile function (with or without medications) desiring surgical correction could undergo tunical plication or plaque incision/excision/grafting. We discussed the general steps of these aforementioned procedures.    I then explained that Peyronie's patients with poor erectile function despite use of medications who wanted to pursue surgical correction may be offered inflatable penile prosthesis surgery. I explained that often times after  penile implant is placed and inflated the curvature could go away or be significantly decreased. For patients needing additional straightening even after device is implanted, intraoperative adjunctive procedures such as modeling, plication, or incision/grafting could be performed. We did briefly discuss what these procedures entailed.               In-office measurement of penile curvature during test erection revealed 35 degree curvature in a right direction. 0.5 units of standard Trimix were administered today for test erection.    Standard Trimix dosing: (alprostatadil 10 mcg, papaverine 30 mg, phentolamine 1 mg - all in 1 cc vial - there are 10 “units” of the mixture in the 1 cc vial)    Since the patient received Trimix in the office for test erection today, I discussed the possibility of having persistent erection, also known as priapism, from the injection.    I advised the patient to monitor the erection once he went home. I went over the below plan with him:  -If erection still present once he gets home, I advised him to apply ice to his penis and to walk around the house (up and down stairs if possible).  -If erection still present after an hour of being home, I advised him to take pseudoephedrine 30 or 60 mg  -If erection still present after an hour of pseudoephedrine, I advised him to go to the emergency room for evaluation            Based on patient curvature measurements and patient's desire to avoid operative intervention for Peyronie's disease at this point, patient would like to pursue intralesional injection of collagenase clostridium histolyticum (Xiaflex).    We did discuss that there are certainly logistical issues with this treatment modality including multiple office visits, need for manual modeling at time of injection, pain, and risk of corporal rupture (around 1-2%).     I explained that most patients do not get a complete straightening of their penis after injection but most can see  "varying amounts of improvement.    I went over the general medication administration schedule.  -2 separate injections are administered within a 1 week time period  -Manual stretching exercises are performed by the patient for the next 4-6 weeks  -This above cycle can be repeated up to 4 times  -After each injection, a tight dressing is placed over the penis, which can be removed the next day    We had further discussion regarding the manual stretching exercises that are to be performed after injection.  -Attempt after manual penile stretching or \"modeling\" should not be done until at least 48 hours after the second of the two injections.  -Modeling of the penis should be done no more than once per day for 4-6 weeks    The patient would like to initiate Xiaflex therapy. Arrangements will be made for patient order the medication and bring the medication into the office for his injection appointments.          PLAN  -Went over ED precautions for Trimix  -Patient wants to pursue Xiaflex. Office will be in touch to set this up.  -PSA in 6 months        Patient's wife, Kayla, present in office today and assisted with history      Portions of the above record have been created with voice recognition software.  Occasional wrong word or \"sound alike\" substitution may have occurred due to the inherent limitations of voice recognition software.  Read the chart carefully and recognize, using context, where substitution may have occurred.      Sylvain Andres,         Chief Complaint     Peyronie's disease    History of Present Illness     See summary above    No fevers or chills        The following portions of the patient's history were reviewed and updated as appropriate: allergies, current medications, past family history, past medical history, past social history, past surgical history and problem list.        AUA SYMPTOM SCORE      Flowsheet Row Most Recent Value   AUA SYMPTOM SCORE    How often have you had a " "sensation of not emptying your bladder completely after you finished urinating? 0 (P)     How often have you had to urinate again less than two hours after you finished urinating? 3 (P)     How often have you found you stopped and started again several times when you urinate? 0 (P)     How often have you found it difficult to postpone urination? 3 (P)     How often have you had a weak urinary stream? 0 (P)     How often have you had to push or strain to begin urination? 0 (P)     How many times did you most typically get up to urinate from the time you went to bed at night until the time you got up in the morning? 5 (P)     Quality of Life: If you were to spend the rest of your life with your urinary condition just the way it is now, how would you feel about that? 1 (P)     AUA SYMPTOM SCORE 11 (P)              Review of Systems     Review of Systems   Constitutional:  Negative for chills and fever.   Respiratory:  Negative for cough and shortness of breath.    Genitourinary:  Negative for dysuria and hematuria.   Neurological:  Negative for dizziness and headaches.   Psychiatric/Behavioral:  Negative for agitation and behavioral problems.        Allergies     Allergies   Allergen Reactions    Codeine Other (See Comments)     Patient endorses \"wacky dreams\". He denies rash or trouble breathing.       Physical Exam     Physical Exam  Constitutional:       General: He is not in acute distress.  HENT:      Head: Normocephalic and atraumatic.   Pulmonary:      Effort: Pulmonary effort is normal. No respiratory distress.   Abdominal:      General: Abdomen is flat.      Palpations: Abdomen is soft.      Tenderness: There is no right CVA tenderness or left CVA tenderness.   Genitourinary:     Comments: Bilateral testis soft nontender  Circumcised  2 cm plaque palpable at the right base of the penis    Office Trimix test erection 9/11/2024 (0.5 units given):  35 degree rightward curvature  15 cm total length  Plaque 4 cm " "proximal to the corona  Skin:     General: Skin is warm and dry.   Neurological:      General: No focal deficit present.      Mental Status: He is alert and oriented to person, place, and time.   Psychiatric:         Mood and Affect: Mood normal.         Behavior: Behavior normal.             Vital Signs  Vitals:    09/11/24 1134   Weight: 101 kg (223 lb)   Height: 6' 4\" (1.93 m)         Current Medications       Current Outpatient Medications:     ALPRAZolam (XANAX) 0.25 mg tablet, Take 0.25 mg by mouth daily as needed for anxiety, Disp: , Rfl:     aspirin 81 mg chewable tablet, Chew 81 mg daily, Disp: , Rfl:     disulfiram (ANTABUSE) 250 mg tablet, Take 250 mg by mouth daily, Disp: , Rfl:     Empagliflozin-metFORMIN HCl ER (Synjardy XR) 5-1000 MG TB24, Synjardy XR 5 mg-1,000 mg tablet, extended release, Disp: , Rfl:     Empagliflozin-metFORMIN HCl ER (Synjardy XR) 5-1000 MG TB24, Take by mouth, Disp: , Rfl:     Multiple Vitamin (multivitamin) capsule, Take 1 capsule by mouth daily, Disp: , Rfl: 0    rivaroxaban (Xarelto) 20 mg tablet, Take 1 tablet (20 mg total) by mouth daily with breakfast, Disp: 30 tablet, Rfl: 5    Turmeric (QC Tumeric Complex) 500 MG CAPS, Take 500 mg by mouth in the morning, Disp: , Rfl:     adalimumab (HUMIRA) 40 mg/0.8 mL PSKT, Inject 1 Dose under the skin every 14 (fourteen) days (Patient not taking: Reported on 6/3/2024), Disp: , Rfl:     chlordiazePOXIDE (LIBRIUM) 25 mg capsule, Take 25 mg by mouth 3 (three) times a day as needed for anxiety (Patient not taking: Reported on 6/3/2024), Disp: , Rfl:     erythromycin (ILOTYCIN) ophthalmic ointment, Place a 1/2 inch ribbon of ointment into the lower eyelid 4 times daily for 5 days (Patient not taking: Reported on 6/3/2024), Disp: 3.5 g, Rfl: 0    Lonapegsomatropin-tcgd (SKYTROFA SC), Inject under the skin Unknown dose (Patient not taking: Reported on 6/3/2024), Disp: , Rfl:     methocarbamol (ROBAXIN) 750 mg tablet, Take 750 mg by mouth " (Patient not taking: Reported on 6/3/2024), Disp: , Rfl:     methylPREDNISolone 4 MG tablet therapy pack, Use as directed on package (Patient not taking: Reported on 6/3/2024), Disp: 21 each, Rfl: 0    naltrexone (REVIA) 50 mg tablet, Take 1 tablet by mouth daily (Patient not taking: Reported on 6/3/2024), Disp: , Rfl:     rivaroxaban (Xarelto Starter Pack) 15 & 20 MG starter pack, Take 1 tablet by mouth 2 (two) times a day Take 15mg tablet twice daily x 21 days followed by 20mg daily afterwards (Patient not taking: Reported on 6/3/2024), Disp: 51 each, Rfl: 0    thiamine 100 MG tablet, Take 1 tablet by mouth daily (Patient not taking: Reported on 6/3/2024), Disp: , Rfl:     Active Problems     Patient Active Problem List   Diagnosis    Plaque psoriasis    Alcohol abuse    Lower leg DVT (deep venous thromboembolism), acute, right (HCC)    Tobacco abuse    Hereditary hemochromatosis (HCC)       Past Medical History     Past Medical History:   Diagnosis Date    Ear problems     HL (hearing loss)     Psoriasis        Surgical History     Past Surgical History:   Procedure Laterality Date    CHOLECYSTECTOMY      GASTRIC BYPASS      IR IVC FILTER PLACEMENT OPTIONAL/TEMPORARY  3/5/2023    IR IVC FILTER REMOVAL  6/23/2023         Family History     History reviewed. No pertinent family history.    Social History     Social History     Social History     Tobacco Use   Smoking Status Every Day    Current packs/day: 1.00    Types: Cigarettes   Smokeless Tobacco Never       Pertinent Lab Values     Lab Results   Component Value Date    CREATININE 1.05 12/22/2023       Lab Results   Component Value Date    PSA 1.530 08/03/2024    PSA 1.15 12/22/2023    PSA 0.8 01/21/2023       Pertinent Imaging     N/A      Pertinent Pathology     N/A      I have spent 45 minutes with Patient and family today in which greater than 50% of this time was spent in counseling/coordination of care regarding Diagnostic results, Prognosis, Risks and  benefits of tx options, Instructions for management, Patient and family education, Importance of tx compliance, Impressions, Counseling / Coordination of care, Documenting in the medical record, Reviewing / ordering tests, medicine, procedures  , and Obtaining or reviewing history  .    Please note this time includes cumulative time on the day of encounter, including reviewing medical records and/or coordinating care among the patient's other specialists.

## 2024-09-11 ENCOUNTER — OFFICE VISIT (OUTPATIENT)
Dept: UROLOGY | Facility: CLINIC | Age: 54
End: 2024-09-11
Payer: COMMERCIAL

## 2024-09-11 VITALS — BODY MASS INDEX: 27.16 KG/M2 | WEIGHT: 223 LBS | HEIGHT: 76 IN

## 2024-09-11 DIAGNOSIS — Z12.5 SCREENING FOR PROSTATE CANCER: Primary | ICD-10-CM

## 2024-09-11 PROCEDURE — 99215 OFFICE O/P EST HI 40 MIN: CPT | Performed by: UROLOGY

## 2024-09-11 PROCEDURE — 54235 NJX CORPORA CAVERNOSA RX AGT: CPT | Performed by: UROLOGY

## 2024-09-11 NOTE — Clinical Note
Hari Andres with urology I saw Mr. Pollock today for his Peyronie's disease He did mention to me that he stopped his Xarelto, but I am not sure that he discussed with you. Just wanted to bring it up to your attention. Let me know if any questions. -Sylvain

## 2024-09-11 NOTE — PATIENT INSTRUCTIONS
You had a test erection performed in the office today. This means that a medication called Trimix was injected into your penis to give you an erection so that the curvature could be accurately measured in the office.    Your erection should go down about an hour or less after the injection. Sometimes erections do not go down right away. This condition is called priapism and can be a medical emergency if it lasts too long.    Please monitor your erection when you go home.  -If your erection is still present by the time you get home, please apply ice to his penis and to walk around the house (up and down stairs if possible).  -If your erection is still present after an hour of being home, please take pseudoephedrine (Sudafed) 30 or 60 mg  -If your erection is still present after an hour of pseudoephedrine, you need to go to the emergency room for evaluation. You may need to have a medicine injected into your penis to bring the erection down.

## 2024-09-11 NOTE — Clinical Note
Patient would like xiaflex. Can we get him set up for 2 separate injection appointments (need to be 7 or less days apart)

## 2024-09-16 ENCOUNTER — TELEPHONE (OUTPATIENT)
Dept: UROLOGY | Facility: CLINIC | Age: 54
End: 2024-09-16

## 2024-09-16 NOTE — TELEPHONE ENCOUNTER
Patient would like xiaflex. Can we get him set up for 2 separate injection appointments (need to be 7 or less days apart)    *Patient needs to sign xiaflex paperwork prior to ordering med and scheduling.

## 2024-09-20 NOTE — TELEPHONE ENCOUNTER
Called and left message for return call, number provided.    When patient returns call please advise he does need to sign a xiaflex benefits investigation form in order to get the xiaflex medication authorized and then eventually sent to the office. This will be at the [place] office for him. Once he signs this and we send it to the  they and [facility] will be contacting him in regards to medication auth, any money owed, and then the office sets up medication delivery and appts.

## 2024-09-24 NOTE — TELEPHONE ENCOUNTER
Left another message for patient to stop in and sign form - it is in the back by the urology checkout girls

## 2024-12-21 ENCOUNTER — APPOINTMENT (OUTPATIENT)
Dept: LAB | Facility: MEDICAL CENTER | Age: 54
End: 2024-12-21
Payer: COMMERCIAL

## 2024-12-21 DIAGNOSIS — L40.0 PSORIASIS VULGARIS: ICD-10-CM

## 2024-12-21 PROCEDURE — 86480 TB TEST CELL IMMUN MEASURE: CPT

## 2024-12-22 LAB
GAMMA INTERFERON BACKGROUND BLD IA-ACNC: 0 IU/ML
M TB IFN-G BLD-IMP: NEGATIVE
M TB IFN-G CD4+ BCKGRND COR BLD-ACNC: 0 IU/ML
M TB IFN-G CD4+ BCKGRND COR BLD-ACNC: 0.03 IU/ML
MITOGEN IGNF BCKGRD COR BLD-ACNC: 10 IU/ML

## 2024-12-28 ENCOUNTER — APPOINTMENT (OUTPATIENT)
Dept: LAB | Facility: MEDICAL CENTER | Age: 54
End: 2024-12-28
Payer: COMMERCIAL

## 2024-12-28 DIAGNOSIS — Z12.5 SCREENING FOR PROSTATE CANCER: ICD-10-CM

## 2024-12-28 DIAGNOSIS — E11.9 TYPE 2 DIABETES MELLITUS WITHOUT COMPLICATION, UNSPECIFIED WHETHER LONG TERM INSULIN USE (HCC): ICD-10-CM

## 2024-12-28 LAB
EST. AVERAGE GLUCOSE BLD GHB EST-MCNC: 206 MG/DL
HBA1C MFR BLD: 8.8 %
PSA SERPL-MCNC: 1.02 NG/ML (ref 0–4)

## 2024-12-28 PROCEDURE — 36415 COLL VENOUS BLD VENIPUNCTURE: CPT

## 2024-12-28 PROCEDURE — 84153 ASSAY OF PSA TOTAL: CPT

## 2024-12-28 PROCEDURE — 83036 HEMOGLOBIN GLYCOSYLATED A1C: CPT

## 2025-03-17 NOTE — PROGRESS NOTES
Collagenase clostridium histolyticum office injection procedure     Date: 3/25/2025     Diagnosis: Peyronie's disease     Injection # 1     Cycle # 1     Prior to his injection today, I have discussed and reviewed all of the details of the patient's history as well as physical exam.     Patient or stands the risk, benefits, and alternatives to his chosen treatment today.  He understands that while he has been diagnosed with Peyronie's disease (or abnormal penile curvature), his treatment is not a medical emergency and that it is a purely elective procedure designed to beneficially impact his quality of life.     I summarized to the patient the following today:  - The patient understands the need for continued follow-up for his Xiaflex/collagenase and I specifically outlined what this would entail including the chronicity of cycled treatments  - I have also explained the idea of stretching exercises that the patient would need to perform while at home as well as additional treatment courses spread over a 4 to 6-week period per treatment course.  - The patient understands that Xiaflex/collagenase treatments pose risks of injury to penile arteries that may cause bruising.  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  - Lastly, I have explained to be on the look out for signs of penile fracture-this includes a popping sensation of the penis both flaccid and erect, as well as hematuria, significant penile pain, significant penile bruising or swelling, or immediate loss of interaction with pain.     I have reiterated the risk of corporal rupture as well as the likelihood that plaque injections will not beneficially impact his penile length.  All questions were answered.     Timeout performed: Yes     Consent obtained: Yes     Description of procedure:  Preprocedural  physical exam performed.  Plaque was palpated.  Penile area was prepped with alcohol swab.  Xiaflex medication was already reconstituted with 0.25 mL of the mixture drawn up in the office.     Needle was introduced into the plaque at the area designated as the point of maximum curvature on previous examination.  0.25 mL of the Xiaflex mixture was then injected into the area of the plaque.  Needle was simultaneously pulled back during injection in order to administer medication equally throughout the plaque.     Penis was then wrapped with Diogenes gauze and Coban.     Patient tolerated procedure well.     I additionally instructed the patient to remove his Coban dressing tomorrow.        PLAN  -His PSA was 1.019. Told him his next PSA can be around spring 2026.  -I will see him for his next Xiaflex appt

## 2025-03-25 ENCOUNTER — PROCEDURE VISIT (OUTPATIENT)
Dept: UROLOGY | Facility: CLINIC | Age: 55
End: 2025-03-25
Payer: COMMERCIAL

## 2025-03-25 VITALS
DIASTOLIC BLOOD PRESSURE: 82 MMHG | OXYGEN SATURATION: 98 % | SYSTOLIC BLOOD PRESSURE: 136 MMHG | HEART RATE: 78 BPM | RESPIRATION RATE: 18 BRPM

## 2025-03-25 DIAGNOSIS — N48.6 PEYRONIE'S DISEASE: Primary | ICD-10-CM

## 2025-03-25 PROCEDURE — 54200 INJECTION PX PEYRONIE DS: CPT | Performed by: UROLOGY

## 2025-03-25 RX ORDER — EMPAGLIFLOZIN, METFORMIN HYDROCHLORIDE 12.5; 1 MG/1; MG/1
1 TABLET, EXTENDED RELEASE ORAL DAILY
COMMUNITY
Start: 2025-02-20

## 2025-03-25 RX ORDER — RISANKIZUMAB-RZAA 150 MG/ML
INJECTION SUBCUTANEOUS
COMMUNITY
Start: 2025-01-29

## 2025-03-25 NOTE — PATIENT INSTRUCTIONS
Xiaflex injection post procedure instructions    You just completed an office visit for Xiaflex injection.    You have a penis wrap dressing in place. You can take this off tomorrow. You can shower after you take the dressing off.    A wide range of bruising is normal after your injection. Some patients have no bruising. Some patients can have black and blue throughout almost the entire penis.    Besides bruising, other possible side effects include penile swelling, pain at injection site, blistering at injection site, small lump at injection site, temporary erectile dysfunction while the area heals (this is mostly secondary to pain rather than actual injury), itching of your genitalia, significant allergic reaction known as anaphylaxis.    Some patients can get a very rare side effect of back pain that spreads to legs and arms. The can pain can feel like spasms that make it hard to walk. These symptoms should only last about 25 minutes and resolve on their own. Even if these symptoms resolve on their own, you should call the office if this occurs.    Rarely, patients can experience dizziness after injection. If this is the case, please lie down until symptoms go away. If symptoms persist, please call office or go the ED.    If you get any of the following rare but dangerous signs or symptoms please go to the ED right away:  -hives  -facial swelling  -breathing trouble  -chest pain  -low blood pressure  -fainting    <1% of patients receiving Xiaflex injection can experience a serious complication known as corporal rupture (aka penile fracture). This occurs when the tough outer layer (tunica albuginea) of your corpora cavernosa (the two large tubes in your penis that fill up with blood during erection) gets a hole in it, which causes large amount of bleeding and swelling in the penis.  How do I know if I have corporal rupture?  You will SIGNIFICANT swelling and discoloration in your penis. Some describe corporal  "rupture penises as having \"eggplant appearance.\"  Corporal rupture usually occurs if penis goes through trauma in the setting of an erection (such as in a sexual encounter). Patients often hearing an audible \"pop\" and will notice IMMEDIATE swelling.  As you will see in physical activity instructions below, you should not be engaging in masturbation or any sexual activity for at least 4-6 weeks after your second injection  What should I do if I am concerned that I have corporal rupture?  Please call the urology office line first. Do not go to the ED unless instructed to do so by the urology staff. This is a very specific problem that many ED physicians may wrongly diagnose without advanced knowledge of the history that would be provided by urology staff.  Does a corporal rupture require surgery to fix it?  Not always, but you will certainly need close evaluation by the urology to make this determination, so it is very important to call us if you think you have corporal rupture!  If I have some bruising and swelling, but not severe, might I still have corporal rupture?  Without the signs and symptoms mentioned above, it is extremely unlikely that you have corporal rupture and likely just have expected post procedure bruising and swelling. In these cases, you just need to monitor area and symptoms should resolve within days to weeks.    Physical activity after injection  No masturbation or sexual activity in between your two injections  No masturbation or sexual activity until 4-6 weeks after your SECOND injection  Avoid activities that may cause you to strain your abdomen during the healing process  If you are prone to constipation, it is recommended that you take an over the counter stool softener such as Miralax or Senna-alessandra to avoid straining during your bowel movements  If possible, you should avoid heavy lifting (anything heavier than 10 pounds) in between your two injections and for at least one week after the " "second injection. If your job requires you to do heavy lifting, it is strongly recommended that you hold off until about 48 hours after each of your injections before you attempt to lift heavy objects. If you have significant bruising or swelling at your penis, even if it is 48 hours after your injection, it is recommended that you wear tight fitting underwear and wait at least 1 day until considering a return to heavy lifting.  In general, use the common sense rule of \"if it hurts you, you probably should not be doing it.\"  Would recommend avoiding cardio exercises (running, biking, rowing, etc.) in between your injections and about 1 week after your second injection    3 days after your SECOND injection you will partake in the stretching exercises as outlined below. Again, this is only after your SECOND injection. You should do absolutely no stretching exercises in between the two injections.  (Images below courtesy of [employer], the company that produces the Xiaflex product)     "

## 2025-03-26 NOTE — PROGRESS NOTES
Collagenase clostridium histolyticum office injection procedure     Date: 3/31/2025     Diagnosis: Peyronie's disease     Injection # 2     Cycle # 1     Prior to his injection today, I have discussed and reviewed all of the details of the patient's history as well as physical exam.     Patient or stands the risk, benefits, and alternatives to his chosen treatment today.  He understands that while he has been diagnosed with Peyronie's disease (or abnormal penile curvature), his treatment is not a medical emergency and that it is a purely elective procedure designed to beneficially impact his quality of life.     I summarized to the patient the following today:  - The patient understands the need for continued follow-up for his Xiaflex/collagenase and I specifically outlined what this would entail including the chronicity of cycled treatments  - I have also explained the idea of stretching exercises that the patient would need to perform while at home as well as additional treatment courses spread over a 4 to 6-week period per treatment course.  - The patient understands that Xiaflex/collagenase treatments pose risks of injury to penile arteries that may cause bruising.  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  - Lastly, I have explained to be on the look out for signs of penile fracture-this includes a popping sensation of the penis both flaccid and erect, as well as hematuria, significant penile pain, significant penile bruising or swelling, or immediate loss of interaction with pain.     I have reiterated the risk of corporal rupture as well as the likelihood that plaque injections will not beneficially impact his penile length.  All questions were answered.     Timeout performed: Yes     Consent obtained: Yes     Description of procedure:  Preprocedural  physical exam performed.  Plaque was palpated.  Penile area was prepped with alcohol swab.  Xiaflex medication was already reconstituted with 0.25 mL of the mixture drawn up in the office.     Needle was introduced into the plaque at the area designated as the point of maximum curvature on previous examination.  0.25 mL of the Xiaflex mixture was then injected into the area of the plaque.  Needle was simultaneously pulled back during injection in order to administer medication equally throughout the plaque.     Penis was then wrapped with Diogenes gauze and Coban.     Patient tolerated procedure well.     I additionally instructed the patient to remove his Coban dressing tomorrow.

## 2025-03-31 ENCOUNTER — PROCEDURE VISIT (OUTPATIENT)
Dept: UROLOGY | Facility: CLINIC | Age: 55
End: 2025-03-31
Payer: COMMERCIAL

## 2025-03-31 VITALS
HEIGHT: 76 IN | WEIGHT: 221 LBS | BODY MASS INDEX: 26.91 KG/M2 | DIASTOLIC BLOOD PRESSURE: 80 MMHG | HEART RATE: 94 BPM | SYSTOLIC BLOOD PRESSURE: 132 MMHG | OXYGEN SATURATION: 97 %

## 2025-03-31 DIAGNOSIS — N48.6 PEYRONIE'S DISEASE: Primary | ICD-10-CM

## 2025-03-31 PROCEDURE — 54200 INJECTION PX PEYRONIE DS: CPT | Performed by: UROLOGY

## 2025-03-31 NOTE — PATIENT INSTRUCTIONS
Xiaflex injection post procedure instructions    You just completed an office visit for Xiaflex injection.    You have a penis wrap dressing in place. You can take this off tomorrow. You can shower after you take the dressing off.    A wide range of bruising is normal after your injection. Some patients have no bruising. Some patients can have black and blue throughout almost the entire penis.    Besides bruising, other possible side effects include penile swelling, pain at injection site, blistering at injection site, small lump at injection site, temporary erectile dysfunction while the area heals (this is mostly secondary to pain rather than actual injury), itching of your genitalia, significant allergic reaction known as anaphylaxis.    Some patients can get a very rare side effect of back pain that spreads to legs and arms. The can pain can feel like spasms that make it hard to walk. These symptoms should only last about 25 minutes and resolve on their own. Even if these symptoms resolve on their own, you should call the office if this occurs.    Rarely, patients can experience dizziness after injection. If this is the case, please lie down until symptoms go away. If symptoms persist, please call office or go the ED.    If you get any of the following rare but dangerous signs or symptoms please go to the ED right away:  -hives  -facial swelling  -breathing trouble  -chest pain  -low blood pressure  -fainting    <1% of patients receiving Xiaflex injection can experience a serious complication known as corporal rupture (aka penile fracture). This occurs when the tough outer layer (tunica albuginea) of your corpora cavernosa (the two large tubes in your penis that fill up with blood during erection) gets a hole in it, which causes large amount of bleeding and swelling in the penis.  How do I know if I have corporal rupture?  You will SIGNIFICANT swelling and discoloration in your penis. Some describe corporal  "rupture penises as having \"eggplant appearance.\"  Corporal rupture usually occurs if penis goes through trauma in the setting of an erection (such as in a sexual encounter). Patients often hearing an audible \"pop\" and will notice IMMEDIATE swelling.  As you will see in physical activity instructions below, you should not be engaging in masturbation or any sexual activity for at least 4-6 weeks after your second injection  What should I do if I am concerned that I have corporal rupture?  Please call the urology office line first. Do not go to the ED unless instructed to do so by the urology staff. This is a very specific problem that many ED physicians may wrongly diagnose without advanced knowledge of the history that would be provided by urology staff.  Does a corporal rupture require surgery to fix it?  Not always, but you will certainly need close evaluation by the urology to make this determination, so it is very important to call us if you think you have corporal rupture!  If I have some bruising and swelling, but not severe, might I still have corporal rupture?  Without the signs and symptoms mentioned above, it is extremely unlikely that you have corporal rupture and likely just have expected post procedure bruising and swelling. In these cases, you just need to monitor area and symptoms should resolve within days to weeks.    Physical activity after injection  No masturbation or sexual activity in between your two injections  No masturbation or sexual activity until 4-6 weeks after your SECOND injection  Avoid activities that may cause you to strain your abdomen during the healing process  If you are prone to constipation, it is recommended that you take an over the counter stool softener such as Miralax or Senna-alessandra to avoid straining during your bowel movements  If possible, you should avoid heavy lifting (anything heavier than 10 pounds) in between your two injections and for at least one week after the " "second injection. If your job requires you to do heavy lifting, it is strongly recommended that you hold off until about 48 hours after each of your injections before you attempt to lift heavy objects. If you have significant bruising or swelling at your penis, even if it is 48 hours after your injection, it is recommended that you wear tight fitting underwear and wait at least 1 day until considering a return to heavy lifting.  In general, use the common sense rule of \"if it hurts you, you probably should not be doing it.\"  Would recommend avoiding cardio exercises (running, biking, rowing, etc.) in between your injections and about 1 week after your second injection    3 days after your SECOND injection you will partake in the stretching exercises as outlined below. Again, this is only after your SECOND injection. You should do absolutely no stretching exercises in between the two injections.  (Images below courtesy of [employer], the company that produces the Xiaflex product)      "

## 2025-08-05 ENCOUNTER — OFFICE VISIT (OUTPATIENT)
Dept: UROLOGY | Facility: CLINIC | Age: 55
End: 2025-08-05
Payer: COMMERCIAL

## 2025-08-05 VITALS
HEIGHT: 76 IN | WEIGHT: 229 LBS | SYSTOLIC BLOOD PRESSURE: 132 MMHG | DIASTOLIC BLOOD PRESSURE: 82 MMHG | BODY MASS INDEX: 27.89 KG/M2 | OXYGEN SATURATION: 97 % | HEART RATE: 69 BPM

## 2025-08-05 DIAGNOSIS — E11.9 TYPE 2 DIABETES MELLITUS WITHOUT COMPLICATION, UNSPECIFIED WHETHER LONG TERM INSULIN USE (HCC): ICD-10-CM

## 2025-08-05 DIAGNOSIS — N52.9 ERECTILE DYSFUNCTION, UNSPECIFIED ERECTILE DYSFUNCTION TYPE: ICD-10-CM

## 2025-08-05 DIAGNOSIS — N48.6 PEYRONIE'S DISEASE: Primary | ICD-10-CM

## 2025-08-05 PROCEDURE — 99214 OFFICE O/P EST MOD 30 MIN: CPT | Performed by: UROLOGY

## 2025-08-05 RX ORDER — OMEGA-3S/DHA/EPA/FISH OIL/D3 300MG-1000
400 CAPSULE ORAL DAILY
COMMUNITY

## 2025-08-05 RX ORDER — METFORMIN HYDROCHLORIDE 750 MG/1
750 TABLET, EXTENDED RELEASE ORAL
COMMUNITY
Start: 2025-06-18 | End: 2026-06-18

## 2025-08-05 RX ORDER — TADALAFIL 5 MG/1
5 TABLET ORAL DAILY
Qty: 30 TABLET | Refills: 6 | Status: SHIPPED | OUTPATIENT
Start: 2025-08-05

## 2025-08-05 RX ORDER — FAMOTIDINE 40 MG/1
TABLET, FILM COATED ORAL
COMMUNITY
Start: 2025-06-17

## 2025-08-05 RX ORDER — MULTIVITAMIN WITH IRON
500 TABLET ORAL DAILY
COMMUNITY

## 2025-08-09 ENCOUNTER — APPOINTMENT (OUTPATIENT)
Dept: LAB | Facility: MEDICAL CENTER | Age: 55
End: 2025-08-09
Payer: COMMERCIAL